# Patient Record
Sex: FEMALE | Race: WHITE | NOT HISPANIC OR LATINO | Employment: FULL TIME | URBAN - METROPOLITAN AREA
[De-identification: names, ages, dates, MRNs, and addresses within clinical notes are randomized per-mention and may not be internally consistent; named-entity substitution may affect disease eponyms.]

---

## 2017-07-24 ENCOUNTER — RECORDS - HEALTHEAST (OUTPATIENT)
Dept: LAB | Facility: CLINIC | Age: 32
End: 2017-07-24

## 2017-07-24 LAB
CHOLEST SERPL-MCNC: 159 MG/DL
FASTING STATUS PATIENT QL REPORTED: YES
HDLC SERPL-MCNC: 45 MG/DL
LDLC SERPL CALC-MCNC: 100 MG/DL
TRIGL SERPL-MCNC: 71 MG/DL

## 2017-07-26 LAB — 17-HYDROXYPROGESTERONE, S: <40 NG/DL

## 2017-07-27 LAB
MISCELLANEOUS TEST DEPT. - HE HISTORICAL: NORMAL
PERFORMING LAB: NORMAL
SPECIMEN STATUS: NORMAL
TEST NAME: NORMAL

## 2017-08-29 ENCOUNTER — AMBULATORY - HEALTHEAST (OUTPATIENT)
Dept: ADMINISTRATIVE | Facility: REHABILITATION | Age: 32
End: 2017-08-29

## 2017-08-29 DIAGNOSIS — M54.42 LEFT-SIDED LOW BACK PAIN WITH LEFT-SIDED SCIATICA: ICD-10-CM

## 2017-08-30 ENCOUNTER — OFFICE VISIT - HEALTHEAST (OUTPATIENT)
Dept: PHYSICAL THERAPY | Facility: REHABILITATION | Age: 32
End: 2017-08-30

## 2017-08-30 DIAGNOSIS — M54.42 ACUTE LEFT-SIDED LOW BACK PAIN WITH LEFT-SIDED SCIATICA: ICD-10-CM

## 2017-09-13 ENCOUNTER — OFFICE VISIT - HEALTHEAST (OUTPATIENT)
Dept: PHYSICAL THERAPY | Facility: REHABILITATION | Age: 32
End: 2017-09-13

## 2017-09-13 DIAGNOSIS — M54.42 ACUTE LEFT-SIDED LOW BACK PAIN WITH LEFT-SIDED SCIATICA: ICD-10-CM

## 2017-09-21 ENCOUNTER — OFFICE VISIT - HEALTHEAST (OUTPATIENT)
Dept: PHYSICAL THERAPY | Facility: REHABILITATION | Age: 32
End: 2017-09-21

## 2017-09-21 DIAGNOSIS — M54.42 ACUTE LEFT-SIDED LOW BACK PAIN WITH LEFT-SIDED SCIATICA: ICD-10-CM

## 2017-10-02 ENCOUNTER — OFFICE VISIT - HEALTHEAST (OUTPATIENT)
Dept: PHYSICAL THERAPY | Facility: REHABILITATION | Age: 32
End: 2017-10-02

## 2017-10-02 DIAGNOSIS — M54.42 ACUTE LEFT-SIDED LOW BACK PAIN WITH LEFT-SIDED SCIATICA: ICD-10-CM

## 2017-10-06 ENCOUNTER — RECORDS - HEALTHEAST (OUTPATIENT)
Dept: ADMINISTRATIVE | Facility: OTHER | Age: 32
End: 2017-10-06

## 2017-10-06 ENCOUNTER — AMBULATORY - HEALTHEAST (OUTPATIENT)
Dept: PHYSICAL MEDICINE AND REHAB | Facility: CLINIC | Age: 32
End: 2017-10-06

## 2017-10-06 DIAGNOSIS — G89.29 CHRONIC LEFT-SIDED LOW BACK PAIN WITH LEFT-SIDED SCIATICA: ICD-10-CM

## 2017-10-06 DIAGNOSIS — M54.42 CHRONIC LEFT-SIDED LOW BACK PAIN WITH LEFT-SIDED SCIATICA: ICD-10-CM

## 2017-10-16 ENCOUNTER — HOSPITAL ENCOUNTER (OUTPATIENT)
Dept: MRI IMAGING | Facility: CLINIC | Age: 32
Discharge: HOME | End: 2017-10-16
Attending: FAMILY MEDICINE

## 2017-10-16 DIAGNOSIS — M79.18 LEFT BUTTOCK PAIN: ICD-10-CM

## 2017-10-18 ENCOUNTER — OFFICE VISIT - HEALTHEAST (OUTPATIENT)
Dept: PHYSICAL THERAPY | Facility: REHABILITATION | Age: 32
End: 2017-10-18

## 2017-10-18 DIAGNOSIS — M54.42 ACUTE LEFT-SIDED LOW BACK PAIN WITH LEFT-SIDED SCIATICA: ICD-10-CM

## 2017-10-19 ENCOUNTER — HOSPITAL ENCOUNTER (OUTPATIENT)
Dept: PHYSICAL MEDICINE AND REHAB | Facility: CLINIC | Age: 32
Discharge: HOME | End: 2017-10-19
Attending: FAMILY MEDICINE

## 2017-10-19 DIAGNOSIS — M54.16 LUMBAR RADICULITIS: ICD-10-CM

## 2017-10-19 DIAGNOSIS — M51.26 LUMBAR DISC HERNIATION: ICD-10-CM

## 2017-10-19 ASSESSMENT — MIFFLIN-ST. JEOR: SCORE: 1582.29

## 2017-10-23 ENCOUNTER — HOSPITAL ENCOUNTER (OUTPATIENT)
Dept: PHYSICAL MEDICINE AND REHAB | Facility: CLINIC | Age: 32
Discharge: HOME | End: 2017-10-23
Attending: PHYSICIAN ASSISTANT

## 2017-10-23 DIAGNOSIS — M51.26 LUMBAR DISC HERNIATION: ICD-10-CM

## 2017-10-23 DIAGNOSIS — M54.16 LUMBAR RADICULITIS: ICD-10-CM

## 2017-11-06 ENCOUNTER — HOSPITAL ENCOUNTER (OUTPATIENT)
Dept: PHYSICAL MEDICINE AND REHAB | Facility: CLINIC | Age: 32
Discharge: HOME | End: 2017-11-06
Attending: PHYSICIAN ASSISTANT

## 2017-11-06 ENCOUNTER — AMBULATORY - HEALTHEAST (OUTPATIENT)
Dept: NEUROSURGERY | Facility: CLINIC | Age: 32
End: 2017-11-06

## 2017-11-06 DIAGNOSIS — M51.26 LUMBAR DISC HERNIATION: ICD-10-CM

## 2017-11-06 DIAGNOSIS — M54.9 BACK PAIN: ICD-10-CM

## 2017-11-06 DIAGNOSIS — M54.16 LUMBAR RADICULITIS: ICD-10-CM

## 2017-11-29 ENCOUNTER — OFFICE VISIT - HEALTHEAST (OUTPATIENT)
Dept: NEUROSURGERY | Facility: CLINIC | Age: 32
End: 2017-11-29

## 2017-11-29 ENCOUNTER — HOSPITAL ENCOUNTER (OUTPATIENT)
Dept: RADIOLOGY | Facility: CLINIC | Age: 32
Discharge: HOME | End: 2017-11-29
Attending: NEUROLOGICAL SURGERY

## 2017-11-29 DIAGNOSIS — M54.9 BACK PAIN: ICD-10-CM

## 2017-11-29 DIAGNOSIS — M51.9 INTERVERTEBRAL DISK SYNDROME: ICD-10-CM

## 2017-11-29 DIAGNOSIS — M54.10 RADICULOPATHY OF LEG: ICD-10-CM

## 2017-11-29 ASSESSMENT — MIFFLIN-ST. JEOR: SCORE: 1566.42

## 2017-12-11 ENCOUNTER — COMMUNICATION - HEALTHEAST (OUTPATIENT)
Dept: NEUROSURGERY | Facility: CLINIC | Age: 32
End: 2017-12-11

## 2017-12-12 ENCOUNTER — RECORDS - HEALTHEAST (OUTPATIENT)
Dept: ADMINISTRATIVE | Facility: OTHER | Age: 32
End: 2017-12-12

## 2017-12-12 ENCOUNTER — COMMUNICATION - HEALTHEAST (OUTPATIENT)
Dept: NEUROSURGERY | Facility: CLINIC | Age: 32
End: 2017-12-12

## 2017-12-15 ENCOUNTER — RECORDS - HEALTHEAST (OUTPATIENT)
Dept: ADMINISTRATIVE | Facility: OTHER | Age: 32
End: 2017-12-15

## 2017-12-15 ENCOUNTER — OFFICE VISIT - HEALTHEAST (OUTPATIENT)
Dept: NEUROSURGERY | Facility: CLINIC | Age: 32
End: 2017-12-15

## 2017-12-15 DIAGNOSIS — Z01.818 PRE-OP EVALUATION: ICD-10-CM

## 2017-12-19 ENCOUNTER — SURGERY - HEALTHEAST (OUTPATIENT)
Dept: SURGERY | Facility: CLINIC | Age: 32
End: 2017-12-19

## 2017-12-19 ENCOUNTER — ANESTHESIA - HEALTHEAST (OUTPATIENT)
Dept: SURGERY | Facility: CLINIC | Age: 32
End: 2017-12-19

## 2017-12-19 ASSESSMENT — MIFFLIN-ST. JEOR: SCORE: 1550.43

## 2017-12-20 ENCOUNTER — COMMUNICATION - HEALTHEAST (OUTPATIENT)
Dept: NEUROSURGERY | Facility: CLINIC | Age: 32
End: 2017-12-20

## 2017-12-20 ENCOUNTER — AMBULATORY - HEALTHEAST (OUTPATIENT)
Dept: NEUROSURGERY | Facility: CLINIC | Age: 32
End: 2017-12-20

## 2017-12-26 ENCOUNTER — COMMUNICATION - HEALTHEAST (OUTPATIENT)
Dept: NEUROSURGERY | Facility: CLINIC | Age: 32
End: 2017-12-26

## 2018-01-04 ENCOUNTER — AMBULATORY - HEALTHEAST (OUTPATIENT)
Dept: NEUROSURGERY | Facility: CLINIC | Age: 33
End: 2018-01-04

## 2018-02-01 ENCOUNTER — OFFICE VISIT - HEALTHEAST (OUTPATIENT)
Dept: NEUROSURGERY | Facility: CLINIC | Age: 33
End: 2018-02-01

## 2018-02-01 DIAGNOSIS — M51.16 LUMBAR DISC HERNIATION WITH RADICULOPATHY: ICD-10-CM

## 2018-02-12 ENCOUNTER — OFFICE VISIT - HEALTHEAST (OUTPATIENT)
Dept: PHYSICAL THERAPY | Facility: CLINIC | Age: 33
End: 2018-02-12

## 2018-02-12 DIAGNOSIS — M54.16 LUMBAR RADICULOPATHY: ICD-10-CM

## 2018-02-12 DIAGNOSIS — M62.81 GENERALIZED MUSCLE WEAKNESS: ICD-10-CM

## 2018-02-15 ENCOUNTER — OFFICE VISIT - HEALTHEAST (OUTPATIENT)
Dept: PHYSICAL THERAPY | Facility: CLINIC | Age: 33
End: 2018-02-15

## 2018-02-15 DIAGNOSIS — M54.16 LUMBAR RADICULOPATHY: ICD-10-CM

## 2018-02-15 DIAGNOSIS — M62.81 GENERALIZED MUSCLE WEAKNESS: ICD-10-CM

## 2018-02-22 ENCOUNTER — OFFICE VISIT - HEALTHEAST (OUTPATIENT)
Dept: PHYSICAL THERAPY | Facility: CLINIC | Age: 33
End: 2018-02-22

## 2018-02-22 DIAGNOSIS — M62.81 GENERALIZED MUSCLE WEAKNESS: ICD-10-CM

## 2018-02-22 DIAGNOSIS — M54.16 LUMBAR RADICULOPATHY: ICD-10-CM

## 2018-02-27 ENCOUNTER — OFFICE VISIT - HEALTHEAST (OUTPATIENT)
Dept: PHYSICAL THERAPY | Facility: CLINIC | Age: 33
End: 2018-02-27

## 2018-02-27 DIAGNOSIS — M54.42 ACUTE LEFT-SIDED LOW BACK PAIN WITH LEFT-SIDED SCIATICA: ICD-10-CM

## 2018-02-27 DIAGNOSIS — M62.81 GENERALIZED MUSCLE WEAKNESS: ICD-10-CM

## 2018-02-27 DIAGNOSIS — M54.16 LUMBAR RADICULOPATHY: ICD-10-CM

## 2018-03-01 ENCOUNTER — OFFICE VISIT - HEALTHEAST (OUTPATIENT)
Dept: PHYSICAL THERAPY | Facility: CLINIC | Age: 33
End: 2018-03-01

## 2018-03-01 DIAGNOSIS — M62.81 GENERALIZED MUSCLE WEAKNESS: ICD-10-CM

## 2018-03-01 DIAGNOSIS — M54.16 LUMBAR RADICULOPATHY: ICD-10-CM

## 2018-03-01 DIAGNOSIS — M54.42 ACUTE LEFT-SIDED LOW BACK PAIN WITH LEFT-SIDED SCIATICA: ICD-10-CM

## 2018-03-06 ENCOUNTER — OFFICE VISIT - HEALTHEAST (OUTPATIENT)
Dept: PHYSICAL THERAPY | Facility: CLINIC | Age: 33
End: 2018-03-06

## 2018-03-06 DIAGNOSIS — M54.42 ACUTE LEFT-SIDED LOW BACK PAIN WITH LEFT-SIDED SCIATICA: ICD-10-CM

## 2018-03-06 DIAGNOSIS — M54.16 LUMBAR RADICULOPATHY: ICD-10-CM

## 2018-03-06 DIAGNOSIS — M62.81 GENERALIZED MUSCLE WEAKNESS: ICD-10-CM

## 2018-03-08 ENCOUNTER — OFFICE VISIT - HEALTHEAST (OUTPATIENT)
Dept: PHYSICAL THERAPY | Facility: CLINIC | Age: 33
End: 2018-03-08

## 2018-03-08 DIAGNOSIS — M54.16 LUMBAR RADICULOPATHY: ICD-10-CM

## 2018-03-08 DIAGNOSIS — M62.81 GENERALIZED MUSCLE WEAKNESS: ICD-10-CM

## 2018-03-08 DIAGNOSIS — M54.42 ACUTE LEFT-SIDED LOW BACK PAIN WITH LEFT-SIDED SCIATICA: ICD-10-CM

## 2018-03-12 ENCOUNTER — OFFICE VISIT - HEALTHEAST (OUTPATIENT)
Dept: PHYSICAL THERAPY | Facility: CLINIC | Age: 33
End: 2018-03-12

## 2018-03-12 DIAGNOSIS — M62.81 GENERALIZED MUSCLE WEAKNESS: ICD-10-CM

## 2018-03-12 DIAGNOSIS — M54.42 ACUTE LEFT-SIDED LOW BACK PAIN WITH LEFT-SIDED SCIATICA: ICD-10-CM

## 2018-03-12 DIAGNOSIS — M54.16 LUMBAR RADICULOPATHY: ICD-10-CM

## 2018-03-15 ENCOUNTER — OFFICE VISIT - HEALTHEAST (OUTPATIENT)
Dept: PHYSICAL THERAPY | Facility: CLINIC | Age: 33
End: 2018-03-15

## 2018-03-15 DIAGNOSIS — M62.81 GENERALIZED MUSCLE WEAKNESS: ICD-10-CM

## 2018-03-15 DIAGNOSIS — M54.16 LUMBAR RADICULOPATHY: ICD-10-CM

## 2018-03-22 ENCOUNTER — OFFICE VISIT - HEALTHEAST (OUTPATIENT)
Dept: PHYSICAL THERAPY | Facility: CLINIC | Age: 33
End: 2018-03-22

## 2018-03-22 DIAGNOSIS — M62.81 GENERALIZED MUSCLE WEAKNESS: ICD-10-CM

## 2018-03-22 DIAGNOSIS — M54.16 LUMBAR RADICULOPATHY: ICD-10-CM

## 2018-03-29 ENCOUNTER — OFFICE VISIT - HEALTHEAST (OUTPATIENT)
Dept: PHYSICAL THERAPY | Facility: CLINIC | Age: 33
End: 2018-03-29

## 2018-03-29 DIAGNOSIS — M54.16 LUMBAR RADICULOPATHY: ICD-10-CM

## 2018-03-29 DIAGNOSIS — M62.81 GENERALIZED MUSCLE WEAKNESS: ICD-10-CM

## 2018-04-05 ENCOUNTER — OFFICE VISIT - HEALTHEAST (OUTPATIENT)
Dept: PHYSICAL THERAPY | Facility: CLINIC | Age: 33
End: 2018-04-05

## 2018-04-05 DIAGNOSIS — M62.81 GENERALIZED MUSCLE WEAKNESS: ICD-10-CM

## 2018-04-05 DIAGNOSIS — M54.16 LUMBAR RADICULOPATHY: ICD-10-CM

## 2018-07-01 ENCOUNTER — TRANSFERRED RECORDS (OUTPATIENT)
Dept: HEALTH INFORMATION MANAGEMENT | Facility: CLINIC | Age: 33
End: 2018-07-01

## 2018-07-01 LAB — PAP SMEAR - HIM PATIENT REPORTED: NEGATIVE

## 2018-07-11 ENCOUNTER — TELEPHONE (OUTPATIENT)
Dept: OTHER | Facility: CLINIC | Age: 33
End: 2018-07-11

## 2018-07-11 NOTE — TELEPHONE ENCOUNTER
7/11/2018    Call Regarding Onboarding [facility]    Attempt 1    Message on voicemail     Comments: 1 adult, 1 child      Outreach   CC

## 2018-07-17 NOTE — TELEPHONE ENCOUNTER
07/17/18      Call Regarding Onboarding [facility]    Attempt 2    Message on voicemail    Comments:       Outreach   Marisol Waite

## 2018-08-02 NOTE — TELEPHONE ENCOUNTER
8/2/2018    Call Regarding Onboarding: [facility]    Attempt 3    Message on voicemail     Comments: spouse, 1 child dep      Outreach   SV

## 2018-10-29 ENCOUNTER — OFFICE VISIT (OUTPATIENT)
Dept: PEDIATRICS | Facility: CLINIC | Age: 33
End: 2018-10-29
Payer: COMMERCIAL

## 2018-10-29 VITALS
OXYGEN SATURATION: 99 % | SYSTOLIC BLOOD PRESSURE: 102 MMHG | WEIGHT: 182 LBS | TEMPERATURE: 98.4 F | HEART RATE: 77 BPM | HEIGHT: 70 IN | DIASTOLIC BLOOD PRESSURE: 68 MMHG | BODY MASS INDEX: 26.05 KG/M2

## 2018-10-29 DIAGNOSIS — J45.40 MODERATE PERSISTENT ASTHMA WITHOUT COMPLICATION: Primary | ICD-10-CM

## 2018-10-29 DIAGNOSIS — F41.1 GAD (GENERALIZED ANXIETY DISORDER): ICD-10-CM

## 2018-10-29 DIAGNOSIS — F32.0 CURRENT MILD EPISODE OF MAJOR DEPRESSIVE DISORDER WITHOUT PRIOR EPISODE (H): ICD-10-CM

## 2018-10-29 PROCEDURE — 99203 OFFICE O/P NEW LOW 30 MIN: CPT | Performed by: PEDIATRICS

## 2018-10-29 RX ORDER — FEXOFENADINE HCL 180 MG/1
180 TABLET ORAL DAILY
COMMUNITY

## 2018-10-29 RX ORDER — MONTELUKAST SODIUM 10 MG/1
1 TABLET ORAL DAILY
Qty: 90 TABLET | Refills: 3 | Status: SHIPPED | OUTPATIENT
Start: 2018-10-29

## 2018-10-29 RX ORDER — BUPROPION HYDROCHLORIDE 300 MG/1
300 TABLET ORAL DAILY
Refills: 3 | COMMUNITY
Start: 2018-08-29

## 2018-10-29 RX ORDER — DROSPIRENONE AND ETHINYL ESTRADIOL 0.02-3(28)
1 KIT ORAL DAILY
Refills: 4 | COMMUNITY
Start: 2018-09-13

## 2018-10-29 RX ORDER — BUPROPION HYDROCHLORIDE 150 MG/1
TABLET ORAL
Qty: 30 TABLET | Refills: 1 | Status: SHIPPED | OUTPATIENT
Start: 2018-10-29

## 2018-10-29 RX ORDER — ALBUTEROL SULFATE 90 UG/1
2 AEROSOL, METERED RESPIRATORY (INHALATION) EVERY 6 HOURS
Qty: 3 INHALER | Refills: 3 | Status: SHIPPED | OUTPATIENT
Start: 2018-10-29

## 2018-10-29 RX ORDER — MONTELUKAST SODIUM 10 MG/1
1 TABLET ORAL DAILY
Refills: 3 | COMMUNITY
Start: 2017-11-20 | End: 2018-10-29

## 2018-10-29 RX ORDER — BUPROPION HYDROCHLORIDE 300 MG/1
300 TABLET ORAL DAILY
Qty: 30 TABLET | Refills: 3 | Status: CANCELLED | OUTPATIENT
Start: 2018-10-29

## 2018-10-29 RX ORDER — ALBUTEROL SULFATE 90 UG/1
2 AEROSOL, METERED RESPIRATORY (INHALATION) EVERY 6 HOURS
COMMUNITY
End: 2018-10-29
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
## 2018-10-29 NOTE — MR AVS SNAPSHOT
"              After Visit Summary   10/29/2018    Balta Garrido    MRN: [id number]           Patient Information     Date Of Birth          1985        Visit Information        Provider Department      10/29/2018 1:00 PM Deyanira Sinha MD [facility]        Today's Diagnoses     Moderate persistent asthma without complication    -  1    Current mild episode of major depressive disorder without prior episode (H)        SHARLA (generalized anxiety disorder)          Care Instructions    Restart montelukast, advair and proair as needed.    Start to taper Wellbutrin - if not going well, please make follow up appt with me.           Follow-ups after your visit        Who to contact     If you have questions or need follow up information about today's clinic visit or your schedule please contact [facility] directly at [phone].  Normal or non-critical lab and imaging results will be communicated to you by MyChart, letter or phone within 4 business days after the clinic has received the results. If you do not hear from us within 7 days, please contact the clinic through MyChart or phone. If you have a critical or abnormal lab result, we will notify you by phone as soon as possible.  Submit refill requests through [employer] or call your pharmacy and they will forward the refill request to us. Please allow 3 business days for your refill to be completed.          Additional Information About Your Visit        MyChart Information     [employer] lets you send messages to your doctor, view your test results, renew your prescriptions, schedule appointments and more. To sign up, go to [email or web address] . Click on \"Log in\" on the left side of the screen, which will take you to the Welcome page. Then click on \"Sign up Now\" on the right side of the page.     You will be asked to enter the access code listed below, as well as some personal information. Please follow the directions to create your " "username and password.     Your access code is: [email or web address]  Expires: 2019  2:06 PM     Your access code will  in 90 days. If you need help or a new code, please call your [facility] or [phone].        Care EveryWhere ID     This is your Care EveryWhere ID. This could be used by other organizations to access your [facility] medical records  [id number]        Your Vitals Were     Pulse Temperature Height Last Period Pulse Oximetry BMI (Body Mass Index)    77 98.4  F (36.9  C) (Oral) 5' 9.5\" (1.765 m) 10/13/2018 99% 26.49 kg/m2       Blood Pressure from Last 3 Encounters:   10/29/18 102/68    Weight from Last 3 Encounters:   10/29/18 182 lb (82.6 kg)              Today, you had the following     No orders found for display         Today's Medication Changes          These changes are accurate as of 10/29/18  2:06 PM.  If you have any questions, ask your nurse or doctor.               These medicines have changed or have updated prescriptions.        Dose/Directions    * buPROPion 300 MG 24 hr tablet   Commonly known as:  WELLBUTRIN XL   This may have changed:  Another medication with the same name was added. Make sure you understand how and when to take each.   Changed by:  Deyanira Sinha MD        Dose:  300 mg   Take 300 mg by mouth daily   Refills:  3       * buPROPion 150 MG 24 hr tablet   Commonly known as:  WELLBUTRIN XL   This may have changed:  You were already taking a medication with the same name, and this prescription was added. Make sure you understand how and when to take each.   Used for:  Current mild episode of major depressive disorder without prior episode (H), SHARLA (generalized anxiety disorder)   Changed by:  Deyanira Sinha MD        Take 150mg once daily for 2 weeks, then take 150mg every other day for 2 weeks.   Quantity:  30 tablet   Refills:  1       fluticasone-salmeterol 250-50 MCG/DOSE diskus inhaler   Commonly known as:  ADVAIR DISKUS   This may have " changed:  when to take this   Used for:  Moderate persistent asthma without complication   Changed by:  Deyanira Sinha MD        Dose:  1 puff   Inhale 1 puff into the lungs 2 times daily   Quantity:  3 Inhaler   Refills:  3       * Notice:  This list has 2 medication(s) that are the same as other medications prescribed for you. Read the directions carefully, and ask your doctor or other care provider to review them with you.         Where to get your medicines      [place] medications [place] [address] [place] [facility] [place] [facility] [place] [address] [facility]  [address] [facility] [address] [facility] [place] [address]     Phone:  [phone]     albuterol 108 (90 Base) MCG/ACT inhaler    buPROPion 150 MG 24 hr tablet    fluticasone-salmeterol 250-50 MCG/DOSE diskus inhaler    montelukast 10 MG tablet                Primary Care Provider Fax #    Physician No Ref-Primary [phone]       No address on file        Equal Access to Services     [facility] : Hadii stefano alvaradoo Sojocelin, waaxda luqadaha, qaybta kaalmada adeisayajohana, poornima escobar . So [facility] [phone].    ATENCIÓN: Si habla español, tiene a velez disposición servicios gratuitos de asistencia lingüística. Comfort al [phone].    We comply with applicable federal civil rights laws and [place] laws. We do not discriminate on the basis of race, color, national origin, age, disability, sex, sexual orientation, or gender identity.            Thank you!     Thank you for choosing [facility]  for your care. Our goal is always to provide you with excellent care. Hearing back from our patients is one way we can continue to improve our services. Please take a few minutes to complete the written survey that you may receive in the mail after your visit with us. Thank you!             Your Updated Medication List - Protect others around you: Learn how to safely use, store and throw away your medicines at www.disposemymeds.org.           This list is accurate as of 10/29/18  2:06 PM.  Always use your most recent med list.                   Brand Name Dispense Instructions for use Diagnosis    albuterol 108 (90 Base) MCG/ACT inhaler    PROAIR HFA/PROVENTIL HFA/VENTOLIN HFA    3 Inhaler    Inhale 2 puffs into the lungs every 6 hours    Moderate persistent asthma without complication       * buPROPion 300 MG 24 hr tablet    WELLBUTRIN XL     Take 300 mg by mouth daily        * buPROPion 150 MG 24 hr tablet    WELLBUTRIN XL    30 tablet    Take 150mg once daily for 2 weeks, then take 150mg every other day for 2 weeks.    Current mild episode of major depressive disorder without prior episode (H), SHARLA (generalized anxiety disorder)       CALCIUM + D3 600-800 MG-UNIT Tabs   Generic drug:  Calcium Carb-Cholecalciferol      Take 1 tablet by mouth daily        fexofenadine 180 MG tablet    ALLEGRA     Take 180 mg by mouth daily        fluticasone-salmeterol 250-50 MCG/DOSE diskus inhaler    ADVAIR DISKUS    3 Inhaler    Inhale 1 puff into the lungs 2 times daily    Moderate persistent asthma without complication       GIANVI 3-0.02 MG per tablet   Generic drug:  drospirenone-ethinyl estradiol      Take 1 tablet by mouth daily        montelukast 10 MG tablet    SINGULAIR    90 tablet    Take 1 tablet (10 mg) by mouth daily    Moderate persistent asthma without complication       * Notice:  This list has 2 medication(s) that are the same as other medications prescribed for you. Read the directions carefully, and ask your doctor or other care provider to review them with you.

## 2018-10-29 NOTE — PATIENT INSTRUCTIONS
Restart montelukast, advair and proair as needed.    Start to taper Wellbutrin - if not going well, please make follow up appt with me.

## 2018-10-31 ASSESSMENT — PATIENT HEALTH QUESTIONNAIRE - PHQ9
SUM OF ALL RESPONSES TO PHQ QUESTIONS 1-9: 1
5. POOR APPETITE OR OVEREATING: SEVERAL DAYS

## 2018-10-31 ASSESSMENT — ANXIETY QUESTIONNAIRES
3. WORRYING TOO MUCH ABOUT DIFFERENT THINGS: SEVERAL DAYS
2. NOT BEING ABLE TO STOP OR CONTROL WORRYING: SEVERAL DAYS
6. BECOMING EASILY ANNOYED OR IRRITABLE: MORE THAN HALF THE DAYS
7. FEELING AFRAID AS IF SOMETHING AWFUL MIGHT HAPPEN: SEVERAL DAYS
IF YOU CHECKED OFF ANY PROBLEMS ON THIS QUESTIONNAIRE, HOW DIFFICULT HAVE THESE PROBLEMS MADE IT FOR YOU TO DO YOUR WORK, TAKE CARE OF THINGS AT HOME, OR GET ALONG WITH OTHER PEOPLE: SOMEWHAT DIFFICULT
1. FEELING NERVOUS, ANXIOUS, OR ON EDGE: SEVERAL DAYS
GAD7 TOTAL SCORE: 8
5. BEING SO RESTLESS THAT IT IS HARD TO SIT STILL: SEVERAL DAYS

## 2018-11-01 ASSESSMENT — ANXIETY QUESTIONNAIRES: GAD7 TOTAL SCORE: 8

## 2018-11-01 ASSESSMENT — ASTHMA QUESTIONNAIRES: ACT_TOTALSCORE: 18

## 2019-01-14 ENCOUNTER — TRANSFERRED RECORDS (OUTPATIENT)
Dept: HEALTH INFORMATION MANAGEMENT | Facility: CLINIC | Age: 34
End: 2019-01-14

## 2019-04-14 ENCOUNTER — APPOINTMENT (OUTPATIENT)
Dept: ULTRASOUND IMAGING | Facility: CLINIC | Age: 34
End: 2019-04-14
Attending: EMERGENCY MEDICINE
Payer: COMMERCIAL

## 2019-04-14 ENCOUNTER — HOSPITAL ENCOUNTER (EMERGENCY)
Facility: CLINIC | Age: 34
Discharge: HOME | End: 2019-04-14
Attending: EMERGENCY MEDICINE | Admitting: EMERGENCY MEDICINE
Payer: COMMERCIAL

## 2019-04-14 VITALS
SYSTOLIC BLOOD PRESSURE: 137 MMHG | RESPIRATION RATE: 20 BRPM | HEIGHT: 70 IN | WEIGHT: 180 LBS | HEART RATE: 78 BPM | TEMPERATURE: 97.7 F | BODY MASS INDEX: 25.77 KG/M2 | OXYGEN SATURATION: 98 % | DIASTOLIC BLOOD PRESSURE: 82 MMHG

## 2019-04-14 DIAGNOSIS — O03.9 SPONTANEOUS ABORTION: Primary | ICD-10-CM

## 2019-04-14 DIAGNOSIS — O46.90 VAGINAL BLEEDING IN PREGNANCY: ICD-10-CM

## 2019-04-14 LAB
ABO + RH BLD: NORMAL
ABO + RH BLD: NORMAL
ANION GAP SERPL CALCULATED.3IONS-SCNC: 7 MMOL/L (ref 3–14)
B-HCG SERPL-ACNC: 1901 IU/L (ref 0–5)
BLOOD BANK CMNT PATIENT-IMP: NORMAL
BLOOD BANK CMNT PATIENT-IMP: NORMAL
BUN SERPL-MCNC: 10 MG/DL (ref 7–30)
CALCIUM SERPL-MCNC: 8.9 MG/DL (ref 8.5–10.1)
CHLORIDE SERPL-SCNC: 108 MMOL/L (ref 94–109)
CO2 SERPL-SCNC: 25 MMOL/L (ref 20–32)
CREAT SERPL-MCNC: 0.57 MG/DL (ref 0.52–1.04)
FETAL CELL SCN BLD QL ROSETTE: NORMAL
GFR SERPL CREATININE-BSD FRML MDRD: >90 ML/MIN/{1.73_M2}
GLUCOSE SERPL-MCNC: 114 MG/DL (ref 70–99)
HGB BLD-MCNC: 14.5 G/DL (ref 11.7–15.7)
POTASSIUM SERPL-SCNC: 3.6 MMOL/L (ref 3.4–5.3)
RH IG VIALS RECOM PATIENT: NORMAL
SODIUM SERPL-SCNC: 140 MMOL/L (ref 133–144)

## 2019-04-14 PROCEDURE — 84702 CHORIONIC GONADOTROPIN TEST: CPT | Performed by: EMERGENCY MEDICINE

## 2019-04-14 PROCEDURE — 96375 TX/PRO/DX INJ NEW DRUG ADDON: CPT

## 2019-04-14 PROCEDURE — 86900 BLOOD TYPING SEROLOGIC ABO: CPT | Performed by: EMERGENCY MEDICINE

## 2019-04-14 PROCEDURE — 80048 BASIC METABOLIC PNL TOTAL CA: CPT | Performed by: EMERGENCY MEDICINE

## 2019-04-14 PROCEDURE — 85461 HEMOGLOBIN FETAL: CPT | Performed by: EMERGENCY MEDICINE

## 2019-04-14 PROCEDURE — 96376 TX/PRO/DX INJ SAME DRUG ADON: CPT

## 2019-04-14 PROCEDURE — 86901 BLOOD TYPING SEROLOGIC RH(D): CPT | Performed by: EMERGENCY MEDICINE

## 2019-04-14 PROCEDURE — 85018 HEMOGLOBIN: CPT | Performed by: EMERGENCY MEDICINE

## 2019-04-14 PROCEDURE — 96374 THER/PROPH/DIAG INJ IV PUSH: CPT

## 2019-04-14 PROCEDURE — 76801 OB US < 14 WKS SINGLE FETUS: CPT

## 2019-04-14 PROCEDURE — 25000128 H RX IP 250 OP 636: Performed by: EMERGENCY MEDICINE

## 2019-04-14 PROCEDURE — 99285 EMERGENCY DEPT VISIT HI MDM: CPT | Mod: 25

## 2019-04-14 RX ORDER — HYDROMORPHONE HYDROCHLORIDE 1 MG/ML
0.5 INJECTION, SOLUTION INTRAMUSCULAR; INTRAVENOUS; SUBCUTANEOUS ONCE
Status: COMPLETED | OUTPATIENT
Start: 2019-04-14 | End: 2019-04-14

## 2019-04-14 RX ORDER — HYDROCODONE BITARTRATE AND ACETAMINOPHEN 5; 325 MG/1; MG/1
1-2 TABLET ORAL EVERY 6 HOURS PRN
Qty: 10 TABLET | Refills: 0 | Status: SHIPPED | OUTPATIENT
Start: 2019-04-14

## 2019-04-14 RX ORDER — FENTANYL CITRATE 50 UG/ML
100 INJECTION, SOLUTION INTRAMUSCULAR; INTRAVENOUS ONCE
Status: COMPLETED | OUTPATIENT
Start: 2019-04-14 | End: 2019-04-14

## 2019-04-14 RX ORDER — KETOROLAC TROMETHAMINE 15 MG/ML
15 INJECTION, SOLUTION INTRAMUSCULAR; INTRAVENOUS ONCE
Status: COMPLETED | OUTPATIENT
Start: 2019-04-14 | End: 2019-04-14

## 2019-04-14 RX ORDER — ACETAMINOPHEN 500 MG
1000 TABLET ORAL ONCE
Status: DISCONTINUED | OUTPATIENT
Start: 2019-04-14 | End: 2019-04-14 | Stop reason: HOSPADM

## 2019-04-14 RX ADMIN — KETOROLAC TROMETHAMINE 15 MG: 15 INJECTION, SOLUTION INTRAMUSCULAR; INTRAVENOUS at 15:51

## 2019-04-14 RX ADMIN — FENTANYL CITRATE 50 MCG: 50 INJECTION, SOLUTION INTRAMUSCULAR; INTRAVENOUS at 14:13

## 2019-04-14 RX ADMIN — Medication 0.5 MG: at 16:38

## 2019-04-14 RX ADMIN — FENTANYL CITRATE 50 MCG: 50 INJECTION, SOLUTION INTRAMUSCULAR; INTRAVENOUS at 14:19

## 2019-04-14 RX ADMIN — Medication 0.5 MG: at 15:26

## 2019-04-14 ASSESSMENT — ENCOUNTER SYMPTOMS
VOMITING: 0
FEVER: 0
CHILLS: 0
SHORTNESS OF BREATH: 0
ABDOMINAL PAIN: 1
NAUSEA: 0

## 2019-04-14 ASSESSMENT — MIFFLIN-ST. JEOR: SCORE: 1583.78

## 2019-04-14 NOTE — ED NOTES
"[facility]  ED Nurse Handoff Report    ED Chief complaint: Abdominal Pain (Patient complains of abdominal pain and cramping. Patient reports she is 12 weeks pregnant. Patient reports brown spotting yesterday and cramping that started this morning. States cramping is getting worse as the day goes on.)      ED Diagnosis:   Final diagnoses:   Vaginal bleeding in pregnancy   Spontaneous        Code Status: Full Code    Allergies:   Allergies   Allergen Reactions     Percocet [Oxycodone-Acetaminophen] Itching       Activity level - Baseline/Home:  Independent    Activity Level - Current:   Independent- SBA     Needed?: No    Isolation: No  Infection: Not Applicable  Bariatric?: No    Vital Signs:   Vitals:    19 1600 19 1615 19 1630 19 1645   BP:       Pulse:       Resp:       Temp:       TempSrc:       SpO2: 100% 98% 100% 97%   Weight:       Height:           Cardiac Rhythm: ,        Pain level: 0-10 Pain Scale: 7    Is this patient confused?: No   Does this patient have a guardian?  No         If yes, is there guardianship documents in the Epic \"Code/ACP\" activity?  N/A         Guardian Notified?  N/A  [place] - Suicide Severity Rating Scale Completed?  Yes  If yes, what color did the patient score?  White    Patient Report: Initial Complaint: abd cramping. 12 weeks pregnant  Focused Assessment: pt having miscarriage. Upon arrival pt had only been spotting brown. Now pt having bright red blood. Cramping very painful even with pain management.   Tests Performed: labs, US  Abnormal Results: miscarriage   Treatments provided: pain management    Family Comments: at bedside, supportive    OBS brochure/video discussed/provided to patient/family: Yes              Name of person given brochure if not patient:               Relationship to patient:     ED Medications:   Medications   Blood Bank will determine if patient is eligible for and the proper dosage of Rho (D) " immune globulin (RhoGam) (has no administration in time range)   acetaminophen (TYLENOL) tablet 1,000 mg (has no administration in time range)   NO Rho (D)  immune globulin (RhoGam) needed  - mother INELIGIBLE (has no administration in time range)   fentaNYL (PF) (SUBLIMAZE) injection 100 mcg (50 mcg Intravenous Given 4/14/19 1419)   HYDROmorphone (PF) (DILAUDID) injection 0.5 mg (0.5 mg Intravenous Given 4/14/19 1526)   ketorolac (TORADOL) injection 15 mg (15 mg Intravenous Given 4/14/19 1551)   HYDROmorphone (PF) (DILAUDID) injection 0.5 mg (0.5 mg Intravenous Given 4/14/19 1638)       Drips infusing?:  No    For the majority of the shift this patient was Green.   Interventions performed were n/a.    Severe Sepsis OR Septic Shock Diagnosis Present: No    To be done/followed up on inpatient unit:      ED NURSE PHONE NUMBER: [phone]

## 2019-04-14 NOTE — ED AVS SNAPSHOT
Emergency Department  [address] [place] [address]  Phone:  [phone]  Fax:  [phone]                                    Balta Garrido   MRN: [id number]    Department:   Emergency Department   Date of Visit:  4/14/2019           After Visit Summary Signature Page    I have received my discharge instructions, and my questions have been answered. I have discussed any challenges I see with this plan with the nurse or doctor.    ..........................................................................................................................................  Patient/Patient Representative Signature      ..........................................................................................................................................  Patient Representative Print Name and Relationship to Patient    ..................................................               ................................................  Date                                   Time    ..........................................................................................................................................  Reviewed by Signature/Title    ...................................................              ..............................................  Date                                               Time          22EPIC Rev 08/18

## 2019-04-14 NOTE — PROGRESS NOTES
RECEIVING UNIT ED HANDOFF REVIEW    ED Nurse Handoff Report was reviewed by: Patito Goode on April 14, 2019 at 5:37 PM

## 2019-04-14 NOTE — ED PROVIDER NOTES
History     Chief Complaint:  Abdominal Pain    HPI   Balta Garrido is a A1 33 year old female at approximately 12 weeks gestation who presents with abdominal pain. The patient reports her pregnancy has been going well so far. She follows with [facility] in [place], [place] and had her first ultrasound on 3/15/19, which showed an intrauterine pregnancy at about 7 weeks 2 days. Yesterday, the patient notes she started experiencing some thick, brown vaginal discharge, but was otherwise asymptomatic. However, around 1030 today, the patient [place] when she started experiencing lower abdominal cramping, which came in waves. Since then, she notes the pain has been progressively worsening, causing her to double over at times, so she had her father bring her to the ED for further evaluation. Here, she denies any dave vaginal bleeding, chest pain, shortness of breath, fevers, chills, or other acute symptoms. The patient notes she did have an upper respiratory infection a few weeks ago, but otherwise has been doing well.    Allergies:  Percocet    Medications:    Albuterol inhaler  Bupropion  Allegra  Advair diskus inhaler  Singulair    Past Medical History:    Anxiety  Depression  Moderate persistent asthma    Past Surgical History:    Tonsillectomy  Lumbar laminectomy, left L5-S1   section    Family History:    Diabetes  Alzheimer's disease  Hypothyroidism  Stroke    Social History:  Smoking status: No  Alcohol use: Yes, socially  Drug use: No  PCP: Deyanira Sinha  Presents to the ED with her father  Marital Status:  [2]     Review of Systems   Constitutional: Negative for chills and fever.   Respiratory: Negative for shortness of breath.    Cardiovascular: Negative for chest pain.   Gastrointestinal: Positive for abdominal pain. Negative for nausea and vomiting.   Genitourinary: Positive for vaginal discharge. Negative for vaginal bleeding.   All other systems  "reviewed and are negative.    Physical Exam     Patient Vitals for the past 24 hrs:   BP Temp Temp src Pulse Resp SpO2 Height Weight   19 1545 -- -- -- -- -- 97 % -- --   19 1530 (!) 127/93 -- -- 64 -- -- -- --   19 1430 -- -- -- -- -- 100 % -- --   19 1415 -- -- -- -- -- 100 % -- --   19 1400 -- -- -- -- -- 100 % -- --   19 1337 (!) 167/101 -- -- -- -- -- -- --   19 1335 -- 97.7  F (36.5  C) Oral 90 16 99 % 1.765 m (5' 9.5\") 81.6 kg (180 lb)     Physical Exam  General: Alert and cooperative with exam. Patient in moderate distress. Normal mentation.  Head:  Scalp is NC/AT  Eyes:  No scleral icterus, PERRL  ENT:  The external nose and ears are normal. The oropharynx is normal and without erythema; mucus membranes are moist. Uvula midline, no evidence of deep space infection.  Neck:  Normal range of motion without rigidity.  CV:  Regular rate and rhythm    No pathologic murmur   Resp:  Breath sounds are clear bilaterally    Non-labored, no retractions or accessory muscle use  GI:  Abdomen is soft, no distension, no tenderness. No peritoneal signs  :  Normal external female genitalia. Cervical ox visualized and closed with few dark red blood clots in the vaginal canal. No evidence of significant ongoing bleeding.  MS:  No lower extremity edema   Skin:  Warm and dry, No rash or lesions noted.  Neuro: Oriented x 3. No gross motor deficits.    Emergency Department Course   Imaging:  Radiographic findings were communicated with the patient who voiced understanding of the findings.    US OB < 14 Weeks w Transvaginal:  Irregular gestational sac in the region of the cervix.  Embryo is seen but no cardiac activity is identified. These findings  are consistent with fetal demise and an  in progress.  As read by Radiology.    Laboratory:  Hemoglobin: 14.5  BMP: Glucose 114 (H), o/w WNL (Creatinine 0.57)  ABO/Rh type: O positive  HCG quant:  (H)    Interventions:  1419: 50 " mcg Fentanyl IV  1526: 0.5 mg Dilaudid IV  1551: 15 mg Toradol IV  1638: 0.5 mg Dilaudid IV    Emergency Department Course:  Past medical records, nursing notes, and vitals reviewed.  1351: I performed an exam of the patient and obtained history, as documented above.  IV inserted and blood drawn.    1406: I performed a brief bedside ultrasound. She will be sent for a formal ultrasound, findings above.    1420: I performed a pelvic exam, findings above.    1451: I spoke to Dr. Benoit on-call for radiology regarding the patient's ultrasound results.    1520: I rechecked the patient. She is still having persistent pain.  1630: I rechecked the patient again. Her pain is not improving. She consents to admission at this time.    1645: Discussed the patient with Dr. Car of Ob/Gyn, who will admit the patient to an observation bed for further monitoring, evaluation, and treatment.     1730: I rechecked the patient as she was reportedly feeling better. She has follow-up scheduled for tomorrow and I think it is reasonable to discharge her with this in place.    1804: I spoke to Dr. Car on-call for Ob/Gyn to update her on the plan for discharge. Patient discharged home with instructions regarding supportive care, medications, and reasons to return. The importance of close follow-up was reviewed.     Impression & Plan    Medical Decision Making:  Balta Garrido is a 33 year old female who presents for evaluation of vaginal bleeding and abdominal cramping. The workup here shows no fetal heart rate and likely spontaneous . I considered a broad differential including ovarian cyst, UTI, pyelonephritis, subchorionic hemorrhage, uterine bleeding, active miscarriage, constipation, etc. More rare, but serious etiologies were also considered including ectopic pregnancy, appendicitis, cholecystitis, volvulus, intraabdominal abscess, heterotopic pregnancy, etc. In this patient, there are no signs of these other serious  etiologies of abdominal pain. I had planned to admit the patient for pain control and spoke to Dr. Car of Ob/Gyn who had accepted her for observation. Later during her ED stay, I re-evaluated the patient. She noted resolution of her pain and symptoms. I will discharge her instead and have her follow-up closely as scheduled with her Ob/Gyn provider tomorrow. Return precautions were discussed.         Diagnosis:    ICD-10-CM    1. Spontaneous  O03.9    2. Vaginal bleeding in pregnancy O46.90      Disposition: Discharged to home    Discharge Medications:    Dose / Directions   HYDROcodone-acetaminophen 5-325 MG tablet  Commonly known as:  NORCO      Dose:  1-2 tablet  Take 1-2 tablets by mouth every 6 hours as needed for breakthrough pain or severe pain  Quantity:  10 tablet  Refills:  0       Shira Yeung  2019    EMERGENCY DEPARTMENT    I, Shira Yeung, am serving as a scribe at 1:51 PM on 2019 to document services personally performed by Yan Sharma DO based on my observations and the provider's statements to me.      Yan Sharma DO  19

## 2019-04-15 ENCOUNTER — ANESTHESIA - HEALTHEAST (OUTPATIENT)
Dept: SURGERY | Facility: AMBULATORY SURGERY CENTER | Age: 34
End: 2019-04-15

## 2019-04-15 ENCOUNTER — SURGERY - HEALTHEAST (OUTPATIENT)
Dept: SURGERY | Facility: AMBULATORY SURGERY CENTER | Age: 34
End: 2019-04-15

## 2019-04-15 ASSESSMENT — MIFFLIN-ST. JEOR
SCORE: 1611.21
SCORE: 1611.21

## 2019-04-29 ENCOUNTER — TRANSFERRED RECORDS (OUTPATIENT)
Dept: HEALTH INFORMATION MANAGEMENT | Facility: CLINIC | Age: 34
End: 2019-04-29

## 2019-07-12 NOTE — DISCHARGE INSTRUCTIONS
Return to the emergency department or seek medical care as instructed if your symptoms fail to improve or significantly worsen.    Take Acetaminophen (aka Tylenol) or Norco (prescription pain medicine) and/or ibuprofen (aka Motrin/Advil) as needed for symptom/pain relief; use as directed.    Follow-up as indicated on page 1.  Maintain adequate hydration and get plenty of rest.     Myalgia Treatment: I explained this is common when taking isotretinoin. If this worsens they will contact us. They may try OTC ibuprofen.

## 2020-03-11 ENCOUNTER — HEALTH MAINTENANCE LETTER (OUTPATIENT)
Age: 35
End: 2020-03-11

## 2020-04-03 ENCOUNTER — SURGERY - HEALTHEAST (OUTPATIENT)
Dept: OBGYN | Facility: CLINIC | Age: 35
End: 2020-04-03

## 2020-04-03 ENCOUNTER — ANESTHESIA - HEALTHEAST (OUTPATIENT)
Dept: OBGYN | Facility: CLINIC | Age: 35
End: 2020-04-03

## 2020-04-03 ENCOUNTER — TRANSFERRED RECORDS (OUTPATIENT)
Dept: HEALTH INFORMATION MANAGEMENT | Facility: CLINIC | Age: 35
End: 2020-04-03

## 2020-04-03 ASSESSMENT — MIFFLIN-ST. JEOR: SCORE: 1759.19

## 2020-04-06 ENCOUNTER — COMMUNICATION - HEALTHEAST (OUTPATIENT)
Dept: OBGYN | Facility: CLINIC | Age: 35
End: 2020-04-06

## 2020-04-06 ENCOUNTER — COMMUNICATION - HEALTHEAST (OUTPATIENT)
Dept: HEALTH INFORMATION MANAGEMENT | Facility: CLINIC | Age: 35
End: 2020-04-06

## 2020-04-17 ENCOUNTER — AMBULATORY - HEALTHEAST (OUTPATIENT)
Dept: PEDIATRICS | Facility: CLINIC | Age: 35
End: 2020-04-17

## 2020-11-25 ENCOUNTER — TRANSFERRED RECORDS (OUTPATIENT)
Dept: HEALTH INFORMATION MANAGEMENT | Facility: CLINIC | Age: 35
End: 2020-11-25

## 2021-01-03 ENCOUNTER — HEALTH MAINTENANCE LETTER (OUTPATIENT)
Age: 36
End: 2021-01-03

## 2021-04-25 ENCOUNTER — HEALTH MAINTENANCE LETTER (OUTPATIENT)
Age: 36
End: 2021-04-25

## 2021-05-07 ENCOUNTER — ANCILLARY PROCEDURE (OUTPATIENT)
Dept: GENERAL RADIOLOGY | Facility: CLINIC | Age: 36
End: 2021-05-07
Attending: FAMILY MEDICINE
Payer: COMMERCIAL

## 2021-05-07 ENCOUNTER — OFFICE VISIT (OUTPATIENT)
Dept: ORTHOPEDICS | Facility: CLINIC | Age: 36
End: 2021-05-07
Payer: COMMERCIAL

## 2021-05-07 VITALS — WEIGHT: 180 LBS | HEIGHT: 69 IN | BODY MASS INDEX: 26.66 KG/M2

## 2021-05-07 DIAGNOSIS — S92.354A NONDISPLACED FRACTURE OF FIFTH METATARSAL BONE, RIGHT FOOT, INITIAL ENCOUNTER FOR CLOSED FRACTURE: Primary | ICD-10-CM

## 2021-05-07 DIAGNOSIS — M79.671 RIGHT FOOT PAIN: ICD-10-CM

## 2021-05-07 DIAGNOSIS — M79.671 RIGHT FOOT PAIN: Primary | ICD-10-CM

## 2021-05-07 LAB — RADIOLOGIST FLAGS: NORMAL

## 2021-05-07 PROCEDURE — 73630 X-RAY EXAM OF FOOT: CPT | Mod: RT | Performed by: RADIOLOGY

## 2021-05-07 PROCEDURE — 99203 OFFICE O/P NEW LOW 30 MIN: CPT | Performed by: FAMILY MEDICINE

## 2021-05-07 ASSESSMENT — MIFFLIN-ST. JEOR: SCORE: 1565.85
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
## 2021-05-13 DIAGNOSIS — S92.354A NONDISPLACED FRACTURE OF FIFTH METATARSAL BONE, RIGHT FOOT, INITIAL ENCOUNTER FOR CLOSED FRACTURE: Primary | ICD-10-CM

## 2021-05-14 ENCOUNTER — ANCILLARY PROCEDURE (OUTPATIENT)
Dept: GENERAL RADIOLOGY | Facility: CLINIC | Age: 36
End: 2021-05-14
Payer: COMMERCIAL

## 2021-05-14 ENCOUNTER — OFFICE VISIT (OUTPATIENT)
Dept: ORTHOPEDICS | Facility: CLINIC | Age: 36
End: 2021-05-14
Payer: COMMERCIAL

## 2021-05-14 VITALS — WEIGHT: 180 LBS | HEIGHT: 69 IN | BODY MASS INDEX: 26.66 KG/M2

## 2021-05-14 DIAGNOSIS — S92.301D CLOSED FRACTURE OF BASE OF METATARSAL BONE OF RIGHT FOOT WITH ROUTINE HEALING, SUBSEQUENT ENCOUNTER: ICD-10-CM

## 2021-05-14 DIAGNOSIS — M79.671 RIGHT FOOT PAIN: Primary | ICD-10-CM

## 2021-05-14 DIAGNOSIS — S92.354A NONDISPLACED FRACTURE OF FIFTH METATARSAL BONE, RIGHT FOOT, INITIAL ENCOUNTER FOR CLOSED FRACTURE: ICD-10-CM

## 2021-05-14 PROCEDURE — 99212 OFFICE O/P EST SF 10 MIN: CPT | Performed by: FAMILY MEDICINE

## 2021-05-14 PROCEDURE — 73630 X-RAY EXAM OF FOOT: CPT | Mod: RT | Performed by: RADIOLOGY

## 2021-05-14 ASSESSMENT — MIFFLIN-ST. JEOR: SCORE: 1565.85
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
## 2021-05-27 NOTE — ANESTHESIA CARE TRANSFER NOTE
Last vitals:   Vitals:    04/15/19 1410   BP: (P) 115/59   Pulse: (P) 72   Resp: (P) 16   Temp: (P) 36.7  C (98  F)   SpO2: (P) 97%     Patient's level of consciousness is drowsy  Spontaneous respirations: yes  Maintains airway independently: yes  Dentition unchanged: yes  Oropharynx: oropharynx clear of all foreign objects    QCDR Measures:  ASA# 20 - Surgical Safety Checklist: WHO surgical safety checklist completed prior to induction    PQRS# 430 - Adult PONV Prevention: 4558F - Pt received => 2 anti-emetic agents (different classes) preop & intraop  ASA# 8 - Peds PONV Prevention: NA - Not pediatric patient, not GA or 2 or more risk factors NOT present  PQRS# 424 - Jaclyn-op Temp Management: 4559F - At least one body temp DOCUMENTED => 35.5C or 95.9F within required timeframe  PQRS# 426 - PACU Transfer Protocol: - Transfer of care checklist used  ASA# 14 - Acute Post-op Pain: ASA14B - Patient did NOT experience pain >= 7 out of 10

## 2021-05-27 NOTE — ANESTHESIA POSTPROCEDURE EVALUATION
Patient: Balta Garrido  SUCTION  DILATION AND CURETTAGE  Anesthesia type: MAC    Patient location: Phase II Recovery  Last vitals:   Vitals Value Taken Time   /63 4/15/2019  2:30 PM   Temp 36.7  C (98  F) 4/15/2019  2:10 PM   Pulse 66 4/15/2019  2:40 PM   Resp 16 4/15/2019  2:30 PM   SpO2 99 % 4/15/2019  2:40 PM   Vitals shown include unvalidated device data.  Post vital signs: stable  Level of consciousness: awake and responds to simple questions  Post-anesthesia pain: pain controlled  Post-anesthesia nausea and vomiting: no  Pulmonary: unassisted, return to baseline  Cardiovascular: stable and blood pressure at baseline  Hydration: adequate  Anesthetic events: no    QCDR Measures:  ASA# 11 - Jaclyn-op Cardiac Arrest: ASA11B - Patient did NOT experience unanticipated cardiac arrest  ASA# 12 - Jaclyn-op Mortality Rate: ASA12B - Patient did NOT die  ASA# 13 - PACU Re-Intubation Rate: NA - No ETT / LMA used for case  ASA# 10 - Composite Anes Safety: ASA10A - No serious adverse event    Additional Notes:

## 2021-05-31 VITALS — WEIGHT: 181 LBS | HEIGHT: 69 IN | BODY MASS INDEX: 26.81 KG/M2

## 2021-05-31 VITALS — BODY MASS INDEX: 27.29 KG/M2 | HEIGHT: 68 IN | WEIGHT: 180.1 LBS

## 2021-05-31 VITALS — WEIGHT: 181 LBS | BODY MASS INDEX: 25.91 KG/M2 | HEIGHT: 70 IN

## 2021-05-31 VITALS — WEIGHT: 181 LBS | BODY MASS INDEX: 26.16 KG/M2

## 2021-06-02 ENCOUNTER — RECORDS - HEALTHEAST (OUTPATIENT)
Dept: ADMINISTRATIVE | Facility: CLINIC | Age: 36
End: 2021-06-02

## 2021-06-03 VITALS — HEIGHT: 69 IN | WEIGHT: 190 LBS | BODY MASS INDEX: 28.14 KG/M2

## 2021-06-03 DIAGNOSIS — S92.301D CLOSED FRACTURE OF BASE OF METATARSAL BONE OF RIGHT FOOT WITH ROUTINE HEALING, SUBSEQUENT ENCOUNTER: Primary | ICD-10-CM

## 2021-06-04 ENCOUNTER — OFFICE VISIT (OUTPATIENT)
Dept: ORTHOPEDICS | Facility: CLINIC | Age: 36
End: 2021-06-04
Payer: COMMERCIAL

## 2021-06-04 ENCOUNTER — ANCILLARY PROCEDURE (OUTPATIENT)
Dept: GENERAL RADIOLOGY | Facility: CLINIC | Age: 36
End: 2021-06-04
Payer: COMMERCIAL

## 2021-06-04 VITALS — WEIGHT: 220 LBS | HEIGHT: 70 IN | BODY MASS INDEX: 31.5 KG/M2

## 2021-06-04 VITALS — HEIGHT: 69 IN | WEIGHT: 180 LBS | BODY MASS INDEX: 26.66 KG/M2

## 2021-06-04 DIAGNOSIS — S92.301D CLOSED FRACTURE OF BASE OF METATARSAL BONE OF RIGHT FOOT WITH ROUTINE HEALING, SUBSEQUENT ENCOUNTER: Primary | ICD-10-CM

## 2021-06-04 DIAGNOSIS — S92.211D CLOSED DISPLACED FRACTURE OF CUBOID OF RIGHT FOOT WITH ROUTINE HEALING, SUBSEQUENT ENCOUNTER: ICD-10-CM

## 2021-06-04 DIAGNOSIS — S92.301D CLOSED FRACTURE OF BASE OF METATARSAL BONE OF RIGHT FOOT WITH ROUTINE HEALING, SUBSEQUENT ENCOUNTER: ICD-10-CM

## 2021-06-04 PROCEDURE — 73630 X-RAY EXAM OF FOOT: CPT | Mod: RT | Performed by: RADIOLOGY

## 2021-06-04 PROCEDURE — 99214 OFFICE O/P EST MOD 30 MIN: CPT | Performed by: FAMILY MEDICINE

## 2021-06-04 ASSESSMENT — MIFFLIN-ST. JEOR: SCORE: 1565.85
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
## 2021-06-07 NOTE — ANESTHESIA CARE TRANSFER NOTE
Last vitals:   Vitals:    04/03/20 1108   BP: 111/55   Pulse: 72   Resp: 16   Temp: 36.5  C (97.7  F)   SpO2: 97%     Patient's level of consciousness is awake  Spontaneous respirations: yes  Maintains airway independently: yes  Dentition unchanged: yes  Oropharynx: oropharynx clear of all foreign objects    QCDR Measures:  ASA# 20 - Surgical Safety Checklist: WHO surgical safety checklist completed prior to induction    PQRS# 430 - Adult PONV Prevention: NA - Not adult patient, not GA or 3 or more risk factors NOT present  ASA# 8 - Peds PONV Prevention: NA - Not pediatric patient, not GA or 2 or more risk factors NOT present  PQRS# 424 - Jaclyn-op Temp Management: 4559F - At least one body temp DOCUMENTED => 35.5C or 95.9F within required timeframe  PQRS# 426 - PACU Transfer Protocol:NA - Patient did not go to PACU  ASA# 14 - Acute Post-op Pain: ASA14B - Patient did NOT experience pain >= 7 out of 10

## 2021-06-07 NOTE — ANESTHESIA POSTPROCEDURE EVALUATION
Patient: Balta Garrido  Procedure(s):  REPEAT  SECTION; POSTPARTUM BILATERAL TUBAL LIGATION  Anesthesia type: spinal    Patient location: PACU  Last vitals:   Vitals Value Taken Time   /73 4/3/2020  1:40 PM   Temp 36.5  C (97.7  F) 4/3/2020 11:08 AM   Pulse 57 4/3/2020  1:40 PM   Resp 16 4/3/2020  1:40 PM   SpO2 100 % 4/3/2020  1:40 PM     Post vital signs: stable  Level of consciousness: awake and responds to simple questions  Post-anesthesia pain: pain controlled  Post-anesthesia nausea and vomiting: no  Pulmonary: unassisted, return to baseline  Cardiovascular: stable and blood pressure at baseline  Hydration: adequate  Anesthetic events: no    QCDR Measures:  ASA# 11 - Jaclyn-op Cardiac Arrest: ASA11B - Patient did NOT experience unanticipated cardiac arrest  ASA# 12 - Jaclyn-op Mortality Rate: ASA12B - Patient did NOT die  ASA# 13 - PACU Re-Intubation Rate: ASA13B - Patient did NOT require a new airway mgmt  ASA# 10 - Composite Anes Safety: ASA10A - No serious adverse event    Additional Notes:

## 2021-06-07 NOTE — ANESTHESIA PROCEDURE NOTES
Peripheral Block    Patient location during procedure: OR  Start time: 4/3/2020 10:50 AM  End time: 4/3/2020 10:53 AM  post-op analgesia per surgeon order as noted in medical record  Staffing:  Performing  Anesthesiologist: Elio Tierney IV, MD  Preanesthetic Checklist  Completed: patient identified, site marked, risks, benefits, and alternatives discussed, timeout performed, consent obtained, at patient's request, airway assessed, oxygen available, suction available, emergency drugs available and hand hygiene performed  Peripheral Block  Block type: other, TAP  Prep: ChloraPrep  Patient position: supine  Patient monitoring: cardiac monitor, continuous pulse oximetry, heart rate and blood pressure  Laterality: bilateral  Injection technique: ultrasound guided    Ultrasound used to visualize needle placement in proximity to nerve being blocked: yes   US used to visualize anesthetic spread  Visualized anatomic structures normal  No Pathological Findings  Permanent ultrasound image captured for medical record  Sterile gel and probe cover used for ultrasound.  Needle  Needle type: echogenic   Needle gauge: 21 G  Needle length: 4 in  no peripheral nerve catheter placed  Assessment  Injection assessment: no difficulty with injection, negative aspiration for heme, no paresthesia on injection and incremental injection

## 2021-06-07 NOTE — TELEPHONE ENCOUNTER
OB Follow Up Phone Call    Patient: Balta Garrido  : 1985  MRN: [id number]     Location [facility]  Provider Deyanira Sinha MD      :   N/A    Language:   English    Discharge Follow-Up:  Follow-Up call by Outreach nurse: Message left for patient    Type of Delivery:      Feeding Method:  Breastfeeding    Comments:   Left message with [facility] Outreach phone number for patient to call back if desired. Reminded patient to schedule postpartum follow-up appointment as directed by PCP at discharge.  Encouraged patient to call clinic/PCP with questions or concerns.    Completed by:   Chiara Reardon RN      Patient: Balta Garrido  : 1985  MRN: [id number]

## 2021-06-07 NOTE — ANESTHESIA PREPROCEDURE EVALUATION
Anesthesia Evaluation      Patient summary reviewed   No history of anesthetic complications     Airway   Mallampati: II  Neck ROM: full   Pulmonary                           Cardiovascular - negative ROS   Neuro/Psych - negative ROS     Endo/Other - negative ROS      GI/Hepatic/Renal - negative ROS           Dental                         Anesthesia Plan  Planned anesthetic: spinal and peripheral nerve block  TAPS  ASA 3     Anesthetic plan and risks discussed with: patient

## 2021-06-07 NOTE — ANESTHESIA PROCEDURE NOTES
Spinal Block    Patient location during procedure: OR  Start time: 4/3/2020 9:40 AM  End time: 4/3/2020 9:42 AM  Reason for block: primary anesthetic    Staffing:  Performing  Anesthesiologist: Elio Tierney IV, MD    Preanesthetic Checklist  Completed: patient identified, risks, benefits, and alternatives discussed, timeout performed, consent obtained, at patient's request, airway assessed, oxygen available, suction available, emergency drugs available and hand hygiene performed  Spinal Block  Patient position: sitting  Prep: ChloraPrep  Patient monitoring: heart rate, cardiac monitor, continuous pulse ox and blood pressure  Approach: midline  Location: L3-4  Injection technique: single-shot  Needle type: pencil-tip   Needle gauge: 24 G

## 2021-06-12 NOTE — PROGRESS NOTES
[facility] Daily Progress Note  Patient Name: Balta Garrido  Date of evaluation: 8/30/2017  Today's Date: 9/13/2017  Visit Number: 2 of 12  Referring provider: Dr. Sharon Huffman  Referral Diagnosis: Left-sided low back pain with left-sided sciatica [M54.42]  - Primary   Visit Diagnosis:     ICD-10-CM    1. Acute left-sided low back pain with left-sided sciatica M54.42        Assessment:       Patient is slowly improving with her left low back/radicular symptoms. She does continue to experience peripheralization with sitting and in the mornings when starting to move.    Goals:  Pt. will demonstrate/verbalize independence in self-management of condition in : 6 weeks  Pt. will be independent with home exercise program in : 6 weeks  Pt will: sleep without increase in LBP in 6 weeks  Pt will: return to regular exercise ie: long walks without increase of radicular symptoms in 6 weeks  Pt will: decrease redicular symptoms to be 0/10 when sitting for 30 minutes to study in 6 weeks     Plan:      Plan for next visit: MT for left gluts/piriformis, manual LE axial distraction, etc.      Subjective:        The back isn't painful, The left leg is the worst.   Has been doing exercises  Has switched to naproxen instead of ibuprofen.  Overall, feels like the stretches are helping, but still has a lot of pain in left leg especially when sitting     Objective:       Able to heel/toe walk, mild pain with heel walk, this is much improved from initial visit.     Patient without tenderness to lumbar spine, tender to left glut/piriformis and left peroneals/gastroc    Exercises: see flowsheet for date performed in clinic  Exercise #1: discussed changing positions often!  Comment #1: supine SKTC change to pulling across body  Exercise #2: supine DKTC can DC  Comment #2: supine SLR slider  Exercise #3: supine LTR  Comment #3: seated slump slider and standing slider  Exercise #4: prone press ups 10x  Comment #4: quadruped  rocking back and forth  Exercise #5: gastroc stretch        Treatment Today     TREATMENT MINUTES COMMENTS   Evaluation     Self-care/ Home management     Manual therapy 20 Left piriformis STM, left peroneals/gastroc SMT, left LE axial distraction in supine and prone   Neuromuscular Re-education     Therapeutic Activity     Therapeutic Exercises 10 Verbal review of exercises to keep working on, added gastroc stretch   Gait training     Modality__________________                Total 30    Blank areas are intentional and mean the treatment did not include these items.        HEMANT BrunnerT, CLT  9/13/2017  1:57 PM

## 2021-06-12 NOTE — PROGRESS NOTES
[facility]   Lumbar Initial Evaluation  Patient Name: Balta Garrido  Date of evaluation: 8/30/2017  Today's Date: 8/30/2017  Visit Number: 1 of 12  Referring provider: Dr. Sharon Huffman  Referral Diagnosis: Left-sided low back pain with left-sided sciatica [M54.42]  - Primary   Visit Diagnosis:     ICD-10-CM    1. Acute left-sided low back pain with left-sided sciatica M54.42        Assessment:      Balta is a 32 y.o. female who presents to physical therapy today with complaints of left sided radicular low back pain that began a few months ago without known injury. Clinical exam today reveals s/s consisent with left L5/S1 radiculopathy, + SLR, + Slump, centralizes with extension. Patient would benefit from further PT in order to improve function. Patient responded positively to MT done today and understand HEP to start working on regularly.     Patient's expectations/goals are: Realistic  Barriers to Learning or Achieving Goals: none    Goals:  Pt. will demonstrate/verbalize independence in self-management of condition in : 6 weeks  Pt. will be independent with home exercise program in : 6 weeks  Pt will: sleep without increase in LBP in 6 weeks  Pt will: return to regular exercise ie: long walks without increase of radicular symptoms in 6 weeks  Pt will: decrease redicular symptoms to be 0/10 when sitting for 30 minutes to study in 6 weeks     Plan:      Plan for next visit: MT for left side low back, manual LE axial distraction, etc.     Plan of Care:   Authorization / Certification Start Date: 08/30/17  Authorization / Certification End Date: 11/30/17  Authorization / Certification Number of Visits: 12  Communication with: Referral Source  Patient Related Instruction: Nature of Condition;Treatment plan and rationale;Self Care instruction;Basis of treatment;Body mechanics;Posture  Discharge Planning: to self care/HEP  Precautions / Restrictions : none  Therapeutic Exercise:  ROM;Stretching;Strengthening  Neuromuscular Reeducation: posture;kinesio tape;core  Manual Therapy: soft tissue mobilization;myofascial release;joint mobilization;muscle energy  Modalities: traction;electrical stimulation;TENS  Gait Training: as needed     Subjective:       History of Present Illness:    Balta is a 32 y.o. female who presents to physical therapy today with complaints of LBP that began a few months ago into left leg as well. Started taking ibuprofen a few times a day because it was getting worse. She powered through the pain d/t getting through the end of grad school. She is now saying it's getting worse, was just on left side in back, now is going down entire left leg. Pt reports symptoms are better with ibuprofen (though isn't working any more), walking is ok and worse with laying down, sitting especially with legs up on ottoman.   Patient denies urinary or bowel incontinence, unintentional weight loss, saddle anesthesia, fever or chills, balance difficulties.    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    Pain rating today:3  Pain rating at best: 0  Pain rating at worst: 9  Pain description: pulsating ache    Functional limitations:  Sitting, working, child car, sleeping     Objective:      Note: Items left blank indicates the item was not performed or not indicated at the time of the evaluation.    Lumbopelvic Examination  1. Acute left-sided low back pain with left-sided sciatica         Precautions/Restrictions: None    Involved side: Left    Observation: patient uncomfortable sitting, stands most of the visit    Lumbar ROM:    Flexion: slight bend forward reproduces symptoms in LE right away.  Extension: moderate limit and shoots down left leg sharply  Side bending: WNL and no symptoms    Lower Extremity Strength:   5/5 bilat hip flexion, knee ext, knee flex, DF, great toe ext, and can heel/toe  walk    Sensation    NT today, reports lateral foot, leg, posterior thigh/glut pain     Lumbar Special Tests:   SLR: 70R, 45L with pain  Slump: +L with just head flexion and +L with left knee ext and no head flexion, +R for left leg symptoms.    Repeated Motion Testing: decrease in leg symptoms with extension, significant increase in symptoms with flexion    Passive Mobility - Joint Integrity: NT today    LE Screen: good hip ROM     Palpation: tender to left SIJ, piriformis, left QL    Patient Outcome Measures:    Modified Oswestry Low Back Pain Disablity Questionnaire  in %: 42   Scores range from 0-100%, where a score of 0% represents minimal pain and maximal function. The minimal clinically important difference is a score reduction of 12%.    Treatment Today     TREATMENT MINUTES COMMENTS   Evaluation 30 lumbar   Self-care/ Home management     Manual therapy 15 Left piriformis, left LE axial distraction   Neuromuscular Re-education     Therapeutic Activity     Therapeutic Exercises 15 See ex flowsheet   Gait training     Modality__________________                Total 60    Blank areas are intentional and mean the treatment did not include these items.   PT Evaluation Code: (Please list factors)  Patient History/Comorbidities: none  Examination: low back  Clinical Presentation: stalbe  Clinical Decision Making: low    Patient History/  Comorbidities Examination  (body structures and functions, activity limitations, and/or participation restrictions) Clinical Presentation Clinical Decision Making (Complexity)   No documented Comorbidities or personal factors 1-2 Elements Stable and/or uncomplicated Low   1-2 documented comorbidities or personal factor 3 Elements Evolving clinical presentation with changing characteristics Moderate   3-4 documented comorbidities or personal factors 4 or more Unstable and unpredictable High          Chiara Mike DPT, CLT  8/30/2017  1:57 PM

## 2021-06-13 NOTE — PROGRESS NOTES
[facility] Daily Progress Note  Patient Name: Balta Garrido  Date of evaluation: 8/30/2017  Today's Date: 9/21/2017  Visit Number: 3 of 12  Referring provider: Dr. Sharon Huffman  Referral Diagnosis: Left-sided low back pain with left-sided sciatica [M54.42]  - Primary   Visit Diagnosis:     ICD-10-CM    1. Acute left-sided low back pain with left-sided sciatica M54.42        Assessment:       Patient was doing well, but now has increased pain in her left lower back and left leg. She is frustrated with where she is at and has an exam next week that she is worried about having to sit for 3 hours to take it. She would benefit from a standing location if possible.   Patient should call her referring provider to discuss current options.     Goals:  Pt. will demonstrate/verbalize independence in self-management of condition in : 6 weeks  Pt. will be independent with home exercise program in : 6 weeks  Pt will: sleep without increase in LBP in 6 weeks  Pt will: return to regular exercise ie: long walks without increase of radicular symptoms in 6 weeks  Pt will: decrease redicular symptoms to be 0/10 when sitting for 30 minutes to study in 6 weeks     Plan:      Plan for next visit: MT for left gluts/piriformis, manual LE axial distraction, etc.      Subjective:        The back is more painful now along with the left.   Has been doing exercises regulalry, but very concerned about taking her board exam d/t not being able to sit for more than a few minutes right now.     Objective:       Able to heel/toe walk, mild pain with heel walk, this is much improved from initial visit.     Patient without tenderness to lumbar spine, tender to left glut/piriformis and left peroneals/gastroc    Exercises: see flowsheet for date performed in clinic  Exercise #1: discussed changing positions often!  Comment #1: supine SKTC change to pulling across body  Exercise #2: supine DKTC can DC  Comment #2: supine SLR slider  Exercise  #3: supine LTR  Comment #3: seated slump slider and standing slider  Exercise #4: prone press ups 10x  Comment #4: quadruped rocking back and forth  Exercise #5: gastroc stretch        Treatment Today     TREATMENT MINUTES COMMENTS   Evaluation     Self-care/ Home management     Manual therapy 30 Left piriformis STM, left peroneals/gastroc SMT, left LE axial distraction in supine and prone   Neuromuscular Re-education     Therapeutic Activity     Therapeutic Exercises     Gait training     Modality__________________                Total 30    Blank areas are intentional and mean the treatment did not include these items.        Chiara Mike, HEMANTT, CLT  9/21/2017  1:57 PM

## 2021-06-13 NOTE — PROGRESS NOTES
[facility] Daily Progress Note  Patient Name: Balta Garrido  Date of evaluation: 8/30/2017  Today's Date: 10/2/2017  Visit Number: 4 of 12  Referring provider: Dr. Sharon Huffman  Referral Diagnosis: Left-sided low back pain with left-sided sciatica [M54.42]  - Primary   Visit Diagnosis:     ICD-10-CM    1. Acute left-sided low back pain with left-sided sciatica M54.42        Assessment:       Patient is back on her ibuprofen, she had to get through her boards exam. She is going to f/u with Dr. Hernandez this week and discussed other treatment options. Patient's progress will likely improve over the next few weeks d/t not having to sit for prolonged periods to study.     Goals:  Pt. will demonstrate/verbalize independence in self-management of condition in : 6 weeks  Pt. will be independent with home exercise program in : 6 weeks  Pt will: sleep without increase in LBP in 6 weeks  Pt will: return to regular exercise ie: long walks without increase of radicular symptoms in 6 weeks  Pt will: decrease redicular symptoms to be 0/10 when sitting for 30 minutes to study in 6 weeks     Plan:      Plan for next visit: MT for left gluts/piriformis, manual LE axial distraction, etc.      Subjective:        Had to sit to take her test last week.   Still having a hard time supine SLR d/t tightness.   Still having cramping in left calf.   Has been taking Ibuprofen a lot lately.      Objective:       Able to heel/toe walk, mild pain with heel walk, this is much improved from initial visit.     Patient tender to lumbar spine, tender to left glut/piriformis and left peroneals/gastroc.    Continues to have neural tension with SLR to about 45 deg.     Exercises: see flowsheet for date performed in clinic  Exercise #1: discussed changing positions often!  Comment #1: supine SKTC change to pulling across body  Exercise #2: supine DKTC can DC  Comment #2: supine SLR slider  Exercise #3: supine LTR  Comment #3: seated slump  slider and standing slider  Exercise #4: prone press ups 10x  Comment #4: quadruped rocking back and forth  Exercise #5: gastroc stretch        Treatment Today     TREATMENT MINUTES COMMENTS   Evaluation     Self-care/ Home management     Manual therapy 30 Left piriformis STM, left peroneals/gastroc SMT, left LE axial distraction in supine and prone   Neuromuscular Re-education     Therapeutic Activity     Therapeutic Exercises     Gait training     Modality__________________                Total 30    Blank areas are intentional and mean the treatment did not include these items.        HEMANT BrunnerT, CLT  10/2/2017  1:57 PM

## 2021-06-13 NOTE — PROGRESS NOTES
[facility] Daily Progress Note  Patient Name: Balta Garrido  Date of evaluation: 8/30/2017  Today's Date: 10/18/2017  Visit Number: 5 of 12  Referring provider: Dr. Sharon Huffman  Referral Diagnosis: Left-sided low back pain with left-sided sciatica [M54.42]  - Primary   Visit Diagnosis:     ICD-10-CM    1. Acute left-sided low back pain with left-sided sciatica M54.42        Assessment:       Patient has been working with PT/doing HEP and activity modification for her left sided LBP, glut pain, calf pain and tingling symptoms for 6 weeks. Her original pain started in May 2017, about 5 months ago. She has been able to do nerve glides, prone progression, stretches, and manual therapy with manual distraction and soft tissue mobilization without increased pain and they actually seem to help her symptoms mildly. She reports she has tried going without ibuprofen for a few weeks, but is unable to get through her day without it, so has returned to taking it. She had an MRI this week confirming a disc extrusion that correlates with her symptoms. She is going to f/u with spine provider on 10- to discuss further treatment options at this time. Patient may return to PT if needed, but we have been working at it without significant improvement.   Patient's right SLR went from 70 deg with crossover response at eval to 90 deg without crossover response.  Her left SLR remains 45 degrees with + for her leg/back symptoms.   Continues to have a positive slump with just head movement and with just leg movement including crossover response.   She has muscle tension in left QL, glut, gastroc and peroneals.   She doesn't have any weakness at this time in L LE and denies loss of bowel and bladder control.   Reflexes weren't assessed at today's visit.   Will keep patient's chart open if she needs to return.     Goals:  Pt. will demonstrate/verbalize independence in self-management of condition: PROGRESSING  Pt. will be  independent with home exercise program: PROGRESSING  Pt will: sleep without increase in LBP: NOT MET  Pt will: return to regular exercise ie: long walks without increase of radicular symptoms: NOT MET  Pt will: decrease redicular symptoms to be 0/10 when sitting for 30 minutes to study: NOT MET     Plan:      Patient is going to see spine PA tomorrow to discuss further treatment options. Will keep PT chart open if she needs to return. Patient to continue with ehr current nerve gliding/stretching routine.     Subjective:        Patient continues to have significant pain and limitations with left leg symptoms.   Unable to do any yoga/exercises. Doing all HEP.      Objective:       Able to heel/toe walk, pain with heel walk    Patient tender to lumbar spine, tender to left glut/piriformis and left peroneals/gastroc.    Continues to have neural tension with SLR to about 45 deg, right LE is 90 degrees without crossover response.    Extension repeats: centralizes  Flexion repeats: peripheralizes    Slump: + for left LE pain with right and left movement.    Exercises: see flowsheet for date performed in clinic  Exercise #1: discussed changing positions often!  Comment #1: supine SKTC change to pulling across body  Exercise #2: supine DKTC can DC  Comment #2: supine SLR slider  Exercise #3: supine LTR  Comment #3: seated slump slider and standing slider  Exercise #4: prone press ups 10x  Comment #4: quadruped rocking back and forth  Exercise #5: gastroc stretch        Treatment Today     TREATMENT MINUTES COMMENTS   Evaluation     Self-care/ Home management     Manual therapy 15 Left piriformis STM, left peroneals/gastroc SMT, left LE axial distraction in supine and prone   Neuromuscular Re-education     Therapeutic Activity     Therapeutic Exercises 15 Time included for reassessment.   Gait training     Modality__________________                Total 30    Blank areas are intentional and mean the treatment did not include  these items.        HEMANT BrunnerT, CLT  10/18/2017  1:57 PM

## 2021-06-13 NOTE — PROGRESS NOTES
ASSESSMENT: Balta Garrido is a 32 y.o. female with past medical history significant for anxiety, depression who presents today for new patient evaluation of a 5 month history of left low back pain and a 2 month history of pain radiating down the left leg.  MRI lumbar spine shows transitional anatomy with a lumbarized S1 segment and a rudimentary S1-S2 disc space.  At L5-S1 there is a moderate left paracentral disc extrusion with severe impingement upon the traversing left S1 nerve root.  The patient demonstrated a slight sensory deficit in both the L5 and S1 dermatome on exam today.  Otherwise, she is neurologically intact.    WU: 32  WHO 5: 11    PLAN:  A shared decision making model was used.  The patient's values and choices were respected.  The following represents what was discussed and decided upon by the physician assistant and the patient.      1.  DIAGNOSTIC TESTS: I reviewed the MRI lumbar spine.  No further diagnostic tests were ordered.    2.  PHYSICAL THERAPY: The patient is currently in physical therapy.  She has had 5 sessions so far.  I encouraged her to continue doing her home exercises.    3.  MEDICATIONS: No changes are made to the patient's medications.  We could consider using gabapentin in the future.    4.  INTERVENTIONS: I offered the patient a left L5-S1, S1-S2 transforaminal epidural steroid injection.  She indicated she would like to proceed and an order was placed.    5.  PATIENT EDUCATION: The patient I discussed that if she fails to improve with a left L5-S1, S1-S2 transforaminal epidural steroid injection, I would refer her to a spine surgeon.    -The patient is in agreement with the above plan.  All questions were answered.    6.  FOLLOW-UP:   The patient return to the clinic for a left L5-S1, S1-S2 transforaminal epidural steroid injection.  She has any questions or concerns in the meantime, she should not hesitate to contact our clinic.      SUBJECTIVE:   Balta Garrido  Is a 32  y.o. female who presents today in consultation at the request of Dr. Huffman for new patient evaluation of low back pain with radiation into the left lower extremity.  The patient states that she has a history of intermittent low back pain.  She states with her previous episodes she would have back pain that lasted a few days and would resolve on its own.  She had one episode last September which required the use of muscle relaxants.  In May, she woke up with what she thought was her typical low back pain, however, her pain persisted and got progressively more severe.  The gradually started to radiate down into her buttock.  2 months ago, she was awoken in the middle of the night by severe pain radiating all the way down the left leg to the ankle.  The patient denies any significant injury or event to cause the pain.  The patient was referred to physical therapy. She has had 5 sessions of physical therapy and does her home exercises, however, her pain is persisting.    The patient complains of left-sided low back pain.  The pain radiates in the left buttock, down the left posterior lateral thigh, and extends into the lateral calf to the ankle.   MRI lumbar spine was obtained and she was referred to our clinic for further evaluation and treatment.  She sometimes feels the pain extends into the left lateral foot.  She has a numb sensation and hypersensitivity in the same distribution as her pain.  She has occasional cramps in the left leg.  She denies weakness in the leg.  Her pain is aggravated with  prolonged sitting, driving, especially in the morning, and bending.  It is alleviated with walking.  She denies any right-sided symptoms.  She denies any loss of bowel or bladder control.    The patient had 5 sessions of physical therapy.  She is doing her home exercises.  She has not had chiropractic treatment.  She has never had any spine surgery or spine injections.  She uses ibuprofen 800 mg 3 times daily.  The  patient prefers not to take medications if possible.    Current Outpatient Prescriptions   Medication Sig Dispense Refill     buPROPion (WELLBUTRIN XL) 300 MG 24 hr tablet Take 300 mg by mouth daily.  1       Allergies   Allergen Reactions     Oxycodone-Acetaminophen      Annotation: itchyprobably histamine release       Patient Active Problem List   Diagnosis     Allergic Rhinitis     Acne     Mild Intermittent Asthma     Anxiety     Post-traumatic Stress Disorder       Past Surgical History:   Procedure Laterality Date     AR REMOVAL OF TONSILS,<9 Y/O      Description: Tonsillectomy;  Recorded: 2008;   Liposuction 2006   section 2013  Tooth extraction    Family history: Father has diabetes, father has heart disease, mother had a stroke    Social history: The patient is .  She has a son who will return for next week.  She is a registered nurse at [employer].  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  She drinks alcohol occasionally.  She denies tobacco use.  She denies illicit drug use.    ROS: Positive for nosebleeds, constipation, poor sleep quality, back pain, leg pain, depression/worry.  Specifically negative for bowel/bladder dysfunction, fevers,chills, appetite changes, unexplained weight loss.   Otherwise 13 systems reviewed are negative.  Please see the patient's intake questionnaire from today for details.      OBJECTIVE:  PHYSICAL EXAMINATION:    CONSTITUTIONAL:  Vital signs as above.  No acute distress.  The patient is well nourished and well groomed.  PSYCHIATRIC:  The patient is awake, alert, oriented to person, place, time and answering questions appropriately with clear speech.    HEENT: Normocephalic, atraumatic.  Sclera clear.  Neck is supple.  SKIN:  Skin over the face, bilateral lower extremities, and posterior torso is clean, dry, intact without rashes.    GAIT:  Gait is non-antalgic.  The patient is able to heel and toe  walk without significant difficulty.    STANDING EXAMINATION: Tender to palpation of the left lower lumbar paraspinous muscles.  Lumbar flexion severely restricted.  Lumbar extension within normal limits.  MUSCLE STRENGTH:  The patient has 5/5 strength for the bilateral hip flexors, knee flexors/extensors, ankle dorsiflexors/plantar flexors, great toe extensors, ankle evertors/invertors.  NEUROLOGICAL: 1+ patellar, and achilles reflexes bilaterally.  Negative Babinski's bilaterally.  No ankle clonus bilaterally.  Subjective diminished/altered sensation over the left L5 and S1 dermatomes.  VASCULAR:  2/4 dorsalis pedis pulses bilaterally.  Bilateral lower extremities are warm.  There is no pitting edema of the bilateral lower extremities.  ABDOMINAL:  Soft, non-distended, non-tender throughout all quadrants.  No pulsatile mass palpated in the left lower quadrant.  LYMPH NODES:  No palpable or tender inguinal lymph nodes.  MUSCULOSKELETAL: Straight leg raise is positive on the left.  Straight leg raise is positive on the right to reproduce left low back pain.    RESULTS: Reviewed the MRI lumbar spine from [facility] dated October 16, 2017.  This shows transitional anatomy with a lumbarized S1 and rudimentary S1-S2 disc space.  There is 2 mm of degenerative retrolisthesis of L5 on the sacrum.  At L5-S1 there is a moderate sized left paracentral disc extrusion which extends into the left lateral recess and results in severe impingement of the traversing left S1 nerve root.  There is also a tiny left paracentral disc extrusion at L4-5 without resulting neural impingement.  Please see report for further details.

## 2021-06-13 NOTE — PROGRESS NOTES
Assessment:   Balta Garrido is a 32 y.o. y.o. female with past medical history significant for anxiety, depression who presents today for follow-up regarding a 6 month history of left low back pain and a 2-3 month history of pain radiating down the left leg.  MRI lumbar spine shows transitional anatomy with a lumbarized S1 segment and rudimentary S1-S2 disc space.  At L5-S1 there is a moderate left paracentral disc extrusion with severe impingement upon the traversing left S1 nerve root.  The patient is status post a left L5-S1, S1-S2 transforaminal epidural steroid injection on October 23, 2017 which provided significant relief of her pain but only lasted as long as the local anesthetic was working.  After local anesthetic wore off, her pain returned to baseline.  She has had increased paresthesias since she was last seen.       Plan:     A shared decision making plan was used.  The patient's values and choices were respected.  The following represents what was discussed and decided upon by the physician assistant and the patient.      1.  DIAGNOSTIC TESTS: I reviewed the MRI lumbar spine.  No further diagnostic tests were ordered.    2.  PHYSICAL THERAPY: The patient attended 5 sessions of physical therapy.  I encouraged to continue doing her home exercises.    3.  MEDICATIONS: No changes are made to the patient's medications.  She can continue using ibuprofen as needed.  We briefly discussed trying gabapentin, but we decided to hold off.    4.  INTERVENTIONS: No further interventions were ordered.    5.  REFERRALS: I referred the patient to neurosurgery since her symptoms have been refractory to conservative treatment.  The patient will prefer to see Dr. Mcgill or Dr. Tong.    6.  FOLLOW-UP: The patient follow-up with me as needed.  We will await recommendations from neurosurgery.  If she has any questions or concerns, she should not hesitate to call.    Subjective:     Balta Garrido is a 32 y.o. female  who presents today for follow-up regarding left low back pain with radiation into the left lower extremity.  Patient status post a left L5-S1, S1-S2 transforaminal epidural steroid injection on October 23, 2017.  The patient reports that she has significant relief of her pain for several hours after the injection (as long as the local anesthetic was working).  Was a local anesthetic wore off, her pain returned to baseline.  Check she had increased pain for a couple days after the injection.  Since the injection she is also had increased paresthesias.    The patient complains of left-sided low back pain.  Pain radiates in the left buttock, down the left posterolateral thigh, into the posterior lateral calf, extending into the lateral foot.  She has intermittent numbness and tingling in the same distribution as her pain which extends into the lateral foot, affecting the fourth and fifth toes.  The patient rates her pain today as a 6 out of 10.  At its best it is a 1-10.  At its worst it is an 8 out of 10.  The patient's pain is aggravated with sitting and alleviated with repositioning.  She denies weakness in the leg.    The patient attended 5 sessions of physical therapy.  She is doing her home exercises.  She uses ibuprofen 800 mg 2-3 times per day.    Past medical history is reviewed and is unchanged in the interim.    Family history is reviewed and is unchanged in the interim.    Review of Systems:  Positive for numbness/tingling.  Negative loss of bowel/bladder control, footdrop, weakness, headache, dizziness, nausea/vomiting, blurry vision, balance changes.     Objective:   CONSTITUTIONAL:  Vital signs as above.  No acute distress.  The patient is well nourished and well groomed.    PSYCHIATRIC:  The patient is awake, alert, oriented to person, place and time.  The patient is answering questions appropriately with clear speech.  Normal affect.  HEENT: Normocephalic, atraumatic.  Sclera clear.    SKIN:  Skin over  the face, posterior torso, bilateral upper and lower extremities is clean, dry, intact without rashes.  MUSCULOSKELETAL:  Gait is non-antalgic.  T The patient has 5/5 strength for the bilateral hip flexors, knee flexors/extensors, ankle dorsiflexors/plantar flexors, ankle evertors/invertors.    NEUROLOGICAL: Subjective diminished/altered sensation of the left S1 dermatome.     RESULTS:  Reviewed the MRI lumbar spine from [facility] dated October 16, 2017.  This shows transitional anatomy with a lumbarized S1 and rudimentary S1-S2 disc space.  There is 2 mm of degenerative retrolisthesis of L5 on the sacrum.  At L5-S1 there is a moderate sized left paracentral disc extrusion which extends into the left lateral recess and results in severe impingement of the traversing left S1 nerve root.  There is also a tiny left paracentral disc extrusion at L4-5 without resulting neural impingement.  Please see report for further details.

## 2021-06-14 NOTE — ANESTHESIA PREPROCEDURE EVALUATION
Anesthesia Evaluation      Patient summary reviewed   No history of anesthetic complications     Airway   Mallampati: II  Neck ROM: full   Pulmonary - normal exam   (+) asthma  moderate,  well controlled,                          Cardiovascular - negative ROS and normal exam   Neuro/Psych    (+) depression, anxiety/panic attacks,     Comments: Herniated lumbar disc - pain, numbness/weakness in left leg      Endo/Other - negative ROS      GI/Hepatic/Renal - negative ROS           Dental - normal exam                        Anesthesia Plan  Planned anesthetic: general endotracheal  - scop patch  ASA 2   Induction: intravenous   Anesthetic plan and risks discussed with: patient  Anesthesia plan special considerations: antiemetics,   Post-op plan: routine recovery

## 2021-06-14 NOTE — PROGRESS NOTES
NEUROSURGERY CONSULTATION NOTE    11/29/2017     Balta Garrido is a 32 y.o. female who is sent to us in consultation by Elsa Yu     for evaluation of LBP and left leg pain     The pt describes the symptoms as having  started  In early August with extreeme pain down the left leg posterior aspect to the side of her foot and little toe.    No inciting event.  Had to take 800 TID ibuprofen, did PT (which by the 3rd session wasn't working) [facility] had a MARGARETH at Left L45 and L5S1 which caused numbness from the lidocaine but no change in her pain.    Pt describes no  weakness but she has intermittent numbness with bending over to shave her legs, or sit on the toilet.   Pain is worse with bending and sitting  and better with  Laying flat.        HPI:     Past Medical History:   Diagnosis Date     Asthma      Depression      Low back pain      Past Surgical History:   Procedure Laterality Date     MS REMOVAL OF TONSILS,<11 Y/O      Description: Tonsillectomy;  Recorded: 08/05/2008;         REVIEW OF SYSTEMS:  ROS reviewed with pt as documented on pt health form of 11/29/2017.    Negative cardiac, pulmonary (asthma), hematological with exception of   .  No family hx of anesthetic reactions   .  No family hx of hypercoagulability         MEDICATIONS:  Current Outpatient Prescriptions   Medication Sig Dispense Refill     buPROPion (WELLBUTRIN XL) 300 MG 24 hr tablet Take 300 mg by mouth daily.  1     calcium carbonate-vit D3-min 600 mg calcium- 400 unit Tab Take by mouth daily.       ibuprofen (ADVIL,MOTRIN) 800 MG tablet Take 800 mg by mouth 3 (three) times a day.       LACTOBACILLUS ACIDOPHILUS (PROBIOTIC ORAL) Take by mouth.       loratadine (CLARITIN) 10 mg tablet Take 10 mg by mouth daily.       montelukast (SINGULAIR) 10 mg tablet Take 10 mg by mouth at bedtime.       PRENATAL 21/IRON FU/FOLIC ACID (PRENATAL COMPLETE ORAL) Take by mouth.       No current facility-administered medications for this visit.   "        ALLERGIES/SENSITIVITIES:    Allergic to percocet  Allergies   Allergen Reactions     Oxycodone-Acetaminophen      Annotation: itchyprobably histamine release         PERTINENT SOCIAL HISTORY:    Social History     Social History     Marital status: Single     Spouse name: N/A     Number of children: N/A     Years of education: N/A     Social History Main Topics     Smoking status: Never Smoker     Smokeless tobacco: None     Alcohol use None     Drug use: None     Sexual activity: Not Asked     Other Topics Concern     None     Social History Narrative         FAMILY HISTORY:  Family History   Problem Relation Age of Onset     Stroke Mother      Alzheimer's disease Mother      Hypothyroidism Mother      Diabetes Father      Hypothyroidism Sister      Diabetes Paternal Uncle      Diabetes Paternal Grandfather         PHYSICAL EXAM:   Constitutional: /83  Pulse 88  Ht 5' 8.75\" (1.746 m)  Wt 181 lb (82.1 kg)  SpO2 98%  BMI 26.92 kg/m2     Mental Status: A & O in no acute distress.  Affect is appropriate.  Speech is fluent.  Recent and remote memory are intact.  Attention span and concentration are normal.     Cranial Nerves: CN1: grossly intact per patient recall. CN2: No funduscopic exam performed. CN3,4 & 6: Pupillary light response, lateral and vertical gaze normal.  No nystagmus.  Visual fields are full to confrontation. CN5: Intact to touch CN7: No facial weakness, smile, facial symmetry intact. CN8: Intact to spoken voice. CN9&10: Gag reflex, uvula midline, palate rises with phonation. CN11: Shoulder shrug 5/5 intact bilaterally. CN12: Tongue midline and moves freely from side to side.     Motor: No pronator drift of upper extremity. Normal bulk and tone all muscle groups of upper and lower extremities.      Sensory: Sensation intact bilaterally to light touch.       Coordination:  Heel/toe/  gait intact.       tandem gait      Reflexes; supinator, biceps, triceps, knee missing " bilaterally  ankle jerk missing on left  minimal hoffmans/     babinski/ clonus.    IMAGING: I personally reviewed all radiographic images    MRI  L5S1 with large left disk compressing the nerve      Flex/ext no instability       CONSULTATION ASSESSMENT AND PLAN:  Pt with large disk at L5S1 and nerve compression.  Lost reflex on ankle.  Failed conservative treatment, recommended L L5S1 HLMD foramenotomy.  Careful counting as there is a patent S1S2 disk .  We discussed risks and benefits and she had questions answered.     I spent more than 45 minutes in this apt, examining the pt, reviewing the scans, reviewing notes from chart, discussing treatment options with risks and benefits and coordinating care. >50 % clinic time was spent in face to face counseling and coordinating care    Sharon Tong       Cc:   Sharon Huffman MD  [address] [facility] [id number]

## 2021-06-14 NOTE — PROGRESS NOTES
[facility] Discharge Summary    Patient Name: Balta Garrido  Date: 12/6/2017  Referral Diagnosis: LBP  Referring provider: Sahron Huffman MD      Patient was seen for 5 visits from 8/30/2017 to 10/18/2017 without missed appointments.  See most recent PT note for updated status.   Patient did not improve with PT.   No further PT scheduled at this time.     Physical Therapy will be discharged at this time.    Thank you for your referral.  Chiara Mike  12/6/2017  2:27 PM

## 2021-06-14 NOTE — PROGRESS NOTES
Preop Assessment: Balta Garrido presents for pre-op review.  Surgeon: Dr. Tong  Name of Surgery: left L5-S1 HLMD  Diagnosis: disc herniation  Date of Surgery: 12/19/2017  Time of Surgery: 1030  Hospital: [facility]  H&P: by Dr. Huffman on 12/15/2017 - cleared for surgery  History of ASA, NSAIDS, vitamin and/or herbal supplements within 10 days: No  History of blood thinners: No  History of anti-seizure med's: No  Review of systems: unchanged    Diagnostics:  Labs: n/a  CXR: n/a  EKG: n/a  Other:   Films: MRI from 10/16/2017 - cleared for surgery      Last BM - this am  Nausea or Vomiting - denies  Urinary retention - denies  Pain management - no Red Flags  Home PT Evaluation: ambulates independently, steady gait and stride, no imbalance noted  - no indication for Home PT pre-op  Patient confirmed they have help/assistance in place at home upon discharge      All questions answered regarding surgery and expected pre and postoperative course including rehabilitation phase.     Reviewed with patient: Arrive 2.5 -3 hours prior to scheduled surgery, nothing to eat or drink after midnight the night before surgery and bring all pertinent films to the hospital the day of surgery.  Continue to refrain from NSAIDS (Ibuprofen, Aleve, Naprosyn) ASA or over the counter herbal medication or supplements, anticoagulants and blood thinners.    Preop skin preparations and instructions provided.      Patient confirmed they have help/assistance in place at home upon discharge    Bridget Chauhan RN, CNRN

## 2021-06-14 NOTE — ANESTHESIA POSTPROCEDURE EVALUATION
Patient: Balta Garrido  LEFT L5-S1 HEMILAMINECTOMY MICRODISCECTOMY FORAMINOTOMY  Anesthesia type: general    Patient location: phase 2  Last vitals:   Vitals:    12/19/17 1645   BP: 131/69   Pulse: 92   Resp:    Temp:    SpO2: 100%     Post vital signs: stable  Level of consciousness: awake and responds to simple questions  Post-anesthesia pain: pain controlled  Post-anesthesia nausea and vomiting: no  Pulmonary: unassisted, return to baseline  Cardiovascular: stable and blood pressure at baseline  Hydration: adequate  Anesthetic events: no    QCDR Measures:  ASA# 11 - Jaclyn-op Cardiac Arrest: ASA11B - Patient did NOT experience unanticipated cardiac arrest  ASA# 12 - Jaclyn-op Mortality Rate: ASA12B - Patient did NOT die  ASA# 13 - PACU Re-Intubation Rate: ASA13B - Patient did NOT require a new airway mgmt  ASA# 10 - Composite Anes Safety: ASA10A - No serious adverse event    Additional Notes:

## 2021-06-14 NOTE — PROGRESS NOTES
Neurosurgery consultation was requested by: CECE Boyle  Pain: Left lower back pain   Radicular Pain is present: Radiates into left buttock and down lateral side of left leg to the ankle  Lhermitte sign: No  Motor complaints: Denies weakness   Sensory complaints: Numbness and tingling in lateral side of left leg to the foot   Gait and balance issues: Denies   Bowel or bladder issues: Denies   Duration of SX is: 8/2017 leg pain started   The symptoms are worse with: Sitting   The symptoms are better with: Standing and ibuprofen   Injury: Denies   Severity is: chronic   Patient has tried the following conservative measures: Pt has done PT and helped mobility but did not help pain. She also did a injection and did not help.  WU score is: 32%  SETH Roberts

## 2021-06-15 NOTE — PROGRESS NOTES
Balta Garrido is status post Left L5S1 hemilaminectomy microdiskectomy on 12/19/2017 by Dr. Tong.  Preoperatively presented with Left LE excruciating radicular pain, worse with positional irritation.  Today she returns in follow up for staples removal. She is very pleased with her outcome - her left leg pain is gone. She denies any weakness in LE. Reports some residual numbness in her left foot. Gait and balance are normal. Not taking pain meds.      Surgical wound WNL - CDI, no signs of infection or skin breakdown.  Incision well-healed: good skin approximation, no redness or visible/palpable edema, no tenderness to palpation.  PT. AF, denies fever, chills or sweats.  Pt. reports that the symptoms are improved from pre-op.    Staples - intact removed without difficulty. Wound prepped with Betadine before and after removal.  Surrounding skin has no signs of breakdown.  Verbal instructions regarding incision care are given.  Pt. advised to call us if any s/s of infection noted - all discussed in details.

## 2021-06-15 NOTE — PROGRESS NOTES
CHART NOTE     1985     DATE OF SERVICE: 2/1/2018     FOLLOW UP EVALUATION:      ASSESSMENT AND PLAN:Balta Garrido is status post LEFT L5-S1 HEMILAMINECTOMY MICRODISCECTOMY FORAMINOTOMY by Dr. Tong on 12/19/2017  Symptoms resolved, no new or worsening deficits. Referral to PT for Medex or like program   Due to non sacralized S1-2, continue weight restrictions x 12 weeks of no lifting > 5 lbs.    Follow up as needed HPI:  Patient initially presented with left leg pain     Today, Balta Garrido reports leg pain resolved--doing well      PAST MEDICAL HISTORY, SURGICAL HISTORY, REVIEW OF SYMPTOMS, MEDICATIONS AND ALLERGIES:  Past medical history, surgical history, review of symptoms, medications and allergies remain unchanged.    Vitals:    02/01/18 0906   BP: 99/66   Pulse: 68   Resp: 16   Temp: 97.8  F (36.6  C)   SpO2: 99%        PHYSICAL EXAM:  Neurological exam reveals:  Alert and oriented x3, speech fluent and appropriate.   PERRL, EOMI, face symmetric, tongue midline, shoulder shrug equal  No pronator drift, finger to nose smooth and accurate bilaterally  Arm strength bilateral grasp, biceps, triceps, and deltoids 5/5   Leg strength bilateral dorsiflexion, plantar flexion, and hip flexion 5/5  Muscle Bulk and tone wnl.   Reflexes:No pathological reflexes   Gait and station:Normal  Incision:healing well

## 2021-06-16 NOTE — PROGRESS NOTES
[facility] Daily Progress     Patient Name: Balta Garrido  Date: 2018  Visit #: 4  PTA visit #:    Referral Diagnosis: lumbar disc herniation, LBP  Referring provider: Christine Rivers CNP  Visit Diagnosis:     ICD-10-CM    1. Lumbar radiculopathy M54.16    2. Generalized muscle weakness M62.81    3. Acute left-sided low back pain with left-sided sciatica M54.42      Assessment:     Overall, patient has improved since having her surgery in left left glut and leg. She is feeling like her strength is returning. She is tolerating MedX well thus far. She continues to benefit from further PT to progress her back to activities like running(not until at least 3 months post op per surgeon), yoga, and  activities.     Goal Status:  Pt will: be independent with HEP within 3 weeks.  Pt will: be able to bend and lift WFL and without restriction for work within 6 weeks.  Pt will: decrease WU by 30% or more to demonstrate improved function within 12 weeks.  Pt will: be able to return to running and yoga within 6 weeks.    Plan / Patient Education:     Continue with initial plan of care.  Progress with home program as tolerated.     Next session: continue MedX DE. and rotary torso, review UUDD and clamshells (did add band for home) , progress core (quadruped, etc). Increase resistance by 7-10# again on medX.    Subjective:     Has been doing ok. She was sore yesterday in her leg, had to use ibuprofen 2 times, but states she hasn't had to do that in a while.   Pain is not bad today.     Objective:     Lumbar MedX Initial testin18 4-week Re-test   AROM (full=  0-72  lumbar) 0-57 deg    Max Extension Torque  141#    Average Extension Torque 94#    Flex: ext ratio (ideal 1.4:1) 3.20:1      Still high reps with 10# increase in resistance on medX today.     No pain into L LE at end of session    Non tender to left calf, mild tender to left glut musculature.    Exercises:  Exercise #1: Previous  HEP: to continue slump sliders, supine sciatic nerve glides, piriformis stretch, LTRs  Comment #1: Rotary torso: started to L, 28#, 90 seconds  Exercise #2: Lower trunk rotation pull-overs: x3 B with 10 sec holds  Comment #2: TA March: x10 B, HEP  Exercise #3: Bridge: x10, HEP  Comment #3: UUDD: x10  Exercise #4: clamshells: x20 B    Treatment Today   2/27/2018   TREATMENT MINUTES COMMENTS   Evaluation     Self-care/ Home management     Manual therapy 10 In supine:  - passive L nerve glides, hip flexion, 90-90 position  In prone:   - manual LE axial distraction   Neuromuscular Re-education     Therapeutic Activity     Therapeutic Exercises 10 See MedX and Exercise flowsheets. Just verbal review of UUDD and planks   Gait training     Modality__________________                Total 20 Late start to session d/t patient late and changing   Blank areas are intentional and mean the treatment did not include these items.       Chiara Mike, PT, DPT  2/27/2018

## 2021-06-16 NOTE — PROGRESS NOTES
[facility] Daily Progress     Patient Name: Balta Garrido  Date: 3/8/2018  Visit #: 7  PTA visit #:    Referral Diagnosis: lumbar disc herniation, LBP  Referring provider: Christine Rivers CNP  Visit Diagnosis:     ICD-10-CM    1. Lumbar radiculopathy M54.16    2. Generalized muscle weakness M62.81    3. Acute left-sided low back pain with left-sided sciatica M54.42      Assessment:     Pt continues to progress well with PT. She shows improvement with strength and ROM today with MedX re-testing. She continues to progress with HEP and tolerating pilates reformer thus far. She has had minimal pain over the past 2 weeks and feels her improvement. She continues to be motivated with her exercises at home. Pt to continue with PT as she progresses.    Goal Status:  Pt will: be independent with HEP within 3 weeks.  Pt will: be able to bend and lift WFL and without restriction for work within 6 weeks.  Pt will: decrease WU by 30% or more to demonstrate improved function within 12 weeks.  Pt will: be able to return to running and yoga within 6 weeks.    Plan / Patient Education:     Next session: continue MedX DE. and rotary torso, Pilates reformer.    Subjective:     Pain rating: no pain  Pt shares that she has been doing well. She did not have any pain in the shower this morning!     Objective:     Lumbar MedX Initial testin18 4-week Re-test: 3/8/18   AROM (full=  0-72  lumbar) 0-57 deg 0-69 deg   Max Extension Torque  141# 228#   Average Extension Torque 94# 194#   Flex: ext ratio (ideal 1.4:1) 3.20:1 1.39:1     Continued pilates reformer today, tolerating well    Improving strength and ROM with MedX re-test today    Exercises:  Exercise #1: Previous HEP: to continue slump sliders, supine sciatic nerve glides, piriformis stretch, LTRs  Comment #1: Rotary torso: started to L, 30#, 90 seconds  Exercise #2: Lower trunk rotation pull-overs: x3 B with 10 sec holds  Comment #2: TA March: x10 B,  HEP  Exercise #3: Bridge: reviewed, single leg (kick outs) x5 B, HEP  Comment #3: UUDD: x10  Exercise #4: clamshells: x20 B  Comment #4: quadruped alt leg/arm: x6 B  Exercise #5: Reviewed planks, with PPT  Comment #5: Reformer: supine leg press double and single leg all springs 10x ea  Exercise #6: Reformer: supine bridges, not letting carriage move, 2R1B 15x  Comment #6: reformer: prancing 2R1B 20x  Exercise #7: reformer: standing hip abduction 1R1B 10x bilat    Treatment Today   3/8/2018   TREATMENT MINUTES COMMENTS   Evaluation     Self-care/ Home management     Manual therapy     Neuromuscular Re-education     Therapeutic Activity     Therapeutic Exercises 25 See MedX and Exercise flowsheets.    Gait training     Modality__________________                Total 25    Blank areas are intentional and mean the treatment did not include these items.       Aric Moore, PT, DPT  3/8/2018

## 2021-06-16 NOTE — PROGRESS NOTES
[facility]   [facility]-Pel[facility] Initial Evaluation    Patient Name: Balta Garrido  Date of evaluation: 2/12/2018  Referral Diagnosis: Lumbar disc herniation with radiculopathy  Referring provider: Christine Rivers CNP  Visit Diagnosis:     ICD-10-CM    1. Lumbar radiculopathy M54.16    2. Generalized muscle weakness M62.81        Assessment:     Balta Garrido is a 32 y.o. female who presents to therapy today with chief complaints of L LE radicular pain that has been improving since left L5-S1 hemilaminectomy, microdiscectomy, foraminotomy on 12/19/17. Pt feels up to 80% improvements since the surgery. Pain is most notable in the L LE with prolonged standing or walking and at the end of the work week. Pt continues to be on a 5# lifting restriction for 5 more weeks. Pt denies any numbness or tingling. Pt demonstrates low back weakness with MedX testing today compared to norms and would benefit from further core and back strengthening with MedX program. Pt reported h/o asthma and PTSD.  Pain symptoms are improving.  Functional impairments include prolonged standing and walking, work, bending, lifting, and ADLs.     status post LEFT L5-S1 HEMILAMINECTOMY MICRODISCECTOMY FORAMINOTOMY by Dr. Tong on 12/19/2017    Impairments in  pain, posture, ROM, strength, ADL's  The POC is dynamic and will be modified on an ongoing basis.  Patient will return to clinic if symptoms persist.  Barriers to achieving goals as noted in the assessment section may affect outcome.  Prognosis to achieve goals is  good   Skilled PT is required to improve mobility, strength, core stability, ROM, flexibility, and reduce pain.  Pt. is appropriate for skilled PT intervention as outlined in the Plan of Care (POC).  Pt. is a good candidate for skilled PT services to improve pain levels and function.    Goals:  No Data Recorded  Pt will: be independent with HEP within 3 weeks.  Pt will: be able to bend and lift WFL and without  restriction for work within 6 weeks.  Pt will: decrease WU by 30% or more to demonstrate improved function within 12 weeks.  Pt will: be able to return to running and yoga within 6 weeks.    Patient's expectations/goals are realistic.    Barriers to Learning or Achieving Goals:  No Barriers.       Plan / Patient Instructions:        Plan of Care:   Authorization / Certification Start Date: 02/12/18  Authorization / Certification End Date: 05/12/18  Authorization / Certification Number of Visits: 14-16  Communication with: Referral Source  Patient Related Instruction: Nature of Condition;Treatment plan and rationale;Self Care instruction;Basis of treatment;Body mechanics;Posture;Precautions;Next steps;Expected outcome  Times per Week: 1-2  Number of Weeks: 8-12  Number of Visits: 14-16  Discharge Planning: Pt will be discharged when all goals are met, lack of progress or worsening condition, MD referral, and/or pt desires to continue with HEP independently.  Therapeutic Exercise: ROM;Stretching;Strengthening  Neuromuscular Reeducation: kinesio tape;posture;TNE;core  Manual Therapy: soft tissue mobilization;myofascial release;joint mobilization;muscle energy  Modalities: electrical stimulation;TENS;iontophoresis;ultrasound;cold pack;hot pack  Functional Training (ADL's): self care;meal prep;ADL's;instructions for equipment;instructions in transfers;compensatory training;ergonomics  Equipment: theraband;TENS unit     POC discussed with patient and patient is in agreement with this plan    Plan for next visit: MedX DE, rotary torso, review HEP, initiate light core, STM as needed     Subjective:    Social information:   Occupation: NP   Work Status:Working full time   Equipment Available: None    History of Present Illness:    Balta is a 32 y.o. female who presents to therapy today with complaints of L radicular pain that has been ongoing since May of 2017. She recently had LEFT L5-S1 HEMILAMINECTOMY MICRODISCECTOMY  FORAMINOTOMY by Dr. Tong on 2017. Pt feels that her pain is 75-80% better since the surgery. Pt says that she is only taking medication once a day since the pain is better. She is no longer having any numbness and tingling as well. She has been able to return to work, but on no lifting above 5# for 12 weeks.  She denies history of similar symptoms. She describes their previous level of function as not limited.    Pain Ratin  Pain rating at best: 0  Pain rating at worst: 9  Pain description: sharp and shooting    Functional limitations are described as occurring with:   dependent care  performing routine daily activities  standing : 2-3 hour tolerance  walking : 4 hour tolerance    Patient reports benefit from:  rest  , heat, cold         Objective:      Note: Items left blank indicates the item was not performed or not indicated at the time of the evaluation.    Patient Outcome Measures :    Modified Oswestry Low Back Pain Disablity Questionnaire  in %: 26   Scores range from 0-100%, where a score of 0% represents minimal pain and maximal function. The minimal clinically important difference is a score reduction of 12%.    Examination  1. Lumbar radiculopathy     2. Generalized muscle weakness       Involved side: Left  Posture Observation:      General sitting posture is  normal.  General standing posture is normal.    Lumbar ROM:    Date: 18     *Indicate scale AROM AROM AROM   Lumbar Flexion Just shy of toes     Lumbar Extension Min restriction      Right Left Right Left Right Left   Lumbar Sidebending Min restriction Min restriction       Lumbar Rotation WFL WFL       Thoracic Flexion      Thoracic Extension      Thoracic Sidebending         Thoracic Rotation           Lower Extremity Strength:     Date: 18     LE strength/5 Right Left Right Left Right Left   Hip Flexion (L1-3) 5 5       Hip Extension (L5-S1)         Hip Abduction (L4-5) 5 5       Hip Adduction (L2-3) 5 5       Hip  External Rotation         Hip Internal Rotation         Knee Extension (L3-4) 5 5       Knee Flexion 5 5       Ankle Dorsiflexion (L4-5) 5 5       Great Toe Extension (L5)         Ankle Plantar flexion (S1)         Abdominals        Sensation: not impaired    Palpation: mild lumbar paraspinal tenderness    Lumbar Special Tests:     Lumbar Special Tests Right Left SI Tests Right  Left   Quadrant test   SI Compression     Straight leg raise   SI Distraction     Crossover response   POSH Test     Slump  + Sacral Thrust     Sit-up test  FADIR     Trunk extensor endurance test  Resisted Abduction     Prone instability test  Other:     Pubic shotgun  Other:       Repeated Motion Testing:  Not indicated    Passive Mobility - Joint Integrity:  WNL    LE Screen:  mild L hamstring tightness    Treatment Today     TREATMENT MINUTES COMMENTS   Evaluation 25    Self-care/ Home management     Manual therapy     Neuromuscular Re-education     Therapeutic Activity     Therapeutic Exercises 25 See exercise and MedX flowsheets. Educated on POC and MedX program. Discussed importance of HEP.   Gait training     Modality__________________                Total 50    Blank areas are intentional and mean the treatment did not include these items.     PT Evaluation Code: (Please list factors)  Patient History/Comorbidities: PTSD, asthma  Examination: lumbar radiculopathy  Clinical Presentation: stable  Clinical Decision Making: low    Patient History/  Comorbidities Examination  (body structures and functions, activity limitations, and/or participation restrictions) Clinical Presentation Clinical Decision Making (Complexity)   No documented Comorbidities or personal factors 1-2 Elements Stable and/or uncomplicated Low   1-2 documented comorbidities or personal factor 3 Elements Evolving clinical presentation with changing characteristics Moderate   3-4 documented comorbidities or personal factors 4 or more Unstable and unpredictable High           Aric Moore, PT, DPT  2/12/2018  8:32 AM

## 2021-06-16 NOTE — PROGRESS NOTES
"[facility] Daily Progress     Patient Name: Balta Garrido  Date: 3/12/2018  Visit #: 8  PTA visit #:    Referral Diagnosis: lumbar disc herniation, LBP  Referring provider: Christine Rivers CNP  Visit Diagnosis:     ICD-10-CM    1. Lumbar radiculopathy M54.16    2. Generalized muscle weakness M62.81    3. Acute left-sided low back pain with left-sided sciatica M54.42      Assessment:     Pt continues to tolerate MedX well. She has progressed with pilates reformer exercises over the past few sessions and having more of a challenge with these exercises. Pt performed side planks today which were more challenging.     Goal Status:  Pt will: be independent with HEP within 3 weeks.  Pt will: be able to bend and lift WFL and without restriction for work within 6 weeks.  Pt will: decrease WU by 30% or more to demonstrate improved function within 12 weeks.  Pt will: be able to return to running and yoga within 6 weeks.    Plan / Patient Education:     Next session: continue MedX DE. and rotary torso, Pilates reformer.    Subjective:     Pain rating: no pain  Pt says that she noticed from soreness yesterday when painting in her house, but \"not bad.\"     Objective:     Lumbar MedX Initial testin18 4-week Re-test: 3/8/18   AROM (full=  0-72  lumbar) 0-57 deg 0-69 deg   Max Extension Torque  141# 228#   Average Extension Torque 94# 194#   Flex: ext ratio (ideal 1.4:1) 3.20:1 1.39:1     Increased difficulty with side planks, added to HEP    No increases pain during session    Exercises:  Exercise #1: Previous HEP: to continue slump sliders, supine sciatic nerve glides, piriformis stretch, LTRs  Comment #1: Rotary torso: started to L, 34#, 90 seconds  Exercise #2: Lower trunk rotation pull-overs: x3 B with 10 sec holds  Comment #2: TA March: x10 B, HEP  Exercise #3: Bridge: reviewed, single leg (kick outs) x5 B, HEP  Comment #3: UUDD: x10  Exercise #4: clamshells: x20 B  Comment #4: quadruped alt " leg/arm: x6 B  Exercise #5: Side planks: x3 B with 20 sec holds  Comment #5: Reformer: supine leg press double and single leg all springs 10x ea  Exercise #6: Reformer: supine bridges, not letting carriage move, 2R1B 15x  Comment #6: \  Exercise #7: reformer: standing hip abduction 1R1B 12x bilat  Comment #7: reformer: overhead pull-downs 2R x15  Exercise #8: reformer: seated rotations 2R x15 B    Treatment Today   3/12/2018   TREATMENT MINUTES COMMENTS   Evaluation     Self-care/ Home management     Manual therapy     Neuromuscular Re-education     Therapeutic Activity     Therapeutic Exercises 25 See MedX and Exercise flowsheets.    Gait training     Modality__________________                Total 25    Blank areas are intentional and mean the treatment did not include these items.       Aric Moore, PT, DPT  3/12/2018

## 2021-06-16 NOTE — PROGRESS NOTES
[facility] Daily Progress     Patient Name: Balta Garrido  Date: 3/6/2018  Visit #: 6  PTA visit #:    Referral Diagnosis: lumbar disc herniation, LBP  Referring provider: Christine Rivers CNP  Visit Diagnosis:     ICD-10-CM    1. Lumbar radiculopathy M54.16    2. Generalized muscle weakness M62.81    3. Acute left-sided low back pain with left-sided sciatica M54.42      Assessment:     Pt continues to progress well with PT. She has been having less overall pain into the L LE and back. She continues to benefit from further PT to increase core strength. Great Compliance overall. Remains motivated to return to core power yoga and running (at 3 months post op).     Goal Status:  Pt will: be independent with HEP within 3 weeks.  Pt will: be able to bend and lift WFL and without restriction for work within 6 weeks.  Pt will: decrease WU by 30% or more to demonstrate improved function within 12 weeks.  Pt will: be able to return to running and yoga within 6 weeks.    Plan / Patient Education:     Next session: continue MedX DE. and rotary torso, Pilates reformer.    Subjective:     Pain rating: no pain  Pt shares that she has been doing well. She said that she had earlier in the week after playing with her child, but has not had to take any pain meds. She also notices more discomfort when in the shower in the am.    Objective:     Lumbar MedX Initial testin18 4-week Re-test   AROM (full=  0-72  lumbar) 0-57 deg    Max Extension Torque  141#    Average Extension Torque 94#    Flex: ext ratio (ideal 1.4:1) 3.20:1      Added reformer exercises today, tolerated well!    Exercises:  Exercise #1: Previous HEP: to continue slump sliders, supine sciatic nerve glides, piriformis stretch, LTRs  Comment #1: Rotary torso: started to L, 30#, 90 seconds  Exercise #2: Lower trunk rotation pull-overs: x3 B with 10 sec holds  Comment #2: TA March: x10 B, HEP  Exercise #3: Bridge: reviewed, single leg (kick outs)  x5 B, HEP  Comment #3: UUDD: x10  Exercise #4: clamshells: x20 B  Comment #4: quadruped alt leg/arm: x6 B  Exercise #5: Reviewed planks, with PPT  Comment #5: Reformer: supine leg press double and single leg all springs 10x ea  Exercise #6: Reformer: supine bridges, not letting carriage move, 2R1B 15x  Comment #6: reformer: prancing 2R1B 20x  Exercise #7: reformer: standing hip abduction 1R1B 10x bilat    Treatment Today   3/6/2018   TREATMENT MINUTES COMMENTS   Evaluation     Self-care/ Home management     Manual therapy 5 Assessed left gastroc, gentle STM, improved myofascial mobility, less knots and feels better overall.    Neuromuscular Re-education     Therapeutic Activity     Therapeutic Exercises 25 See MedX and Exercise flowsheets.    Gait training     Modality__________________                Total 30    Blank areas are intentional and mean the treatment did not include these items.       Chiara Mike, PT, DPT  3/6/2018

## 2021-06-16 NOTE — PROGRESS NOTES
[facility] Daily Progress     Patient Name: Balta Garrido  Date: 3/22/2018  Visit #: 10  PTA visit #:    Referral Diagnosis: lumbar disc herniation, LBP  Referring provider: Christine Rivers CNP  Visit Diagnosis:     ICD-10-CM    1. Lumbar radiculopathy M54.16    2. Generalized muscle weakness M62.81      Assessment:     Pt having no pain today. She continues to tolerate increases in resistance with MedX DE and rotary torso. She has been able to progress with planks and tolerating for longer durations. Based on progress, pt may be able to discharge from PT within a few sessions. Continues to be very compliant with HEP.     Goal Status:  Pt will: be independent with HEP within 3 weeks.  Pt will: be able to bend and lift WFL and without restriction for work within 6 weeks.  Pt will: decrease WU by 30% or more to demonstrate improved function within 12 weeks.  Pt will: be able to return to running and yoga within 6 weeks.    Plan / Patient Education:     Next session: continue MedX DE and rotary torso, Pilates reformer, band exercises for multifidus, trial supermans, discuss possible discharge if appropriate    Subjective:     Pain rating: no pain  Pt doing well! She is not having any pain today. She did notice some tightness in her middle back yesterday.    Objective:     Lumbar MedX Initial testin18 4-week Re-test: 3/8/18   AROM (full=  0-72  lumbar) 0-57 deg 0-69 deg   Max Extension Torque  141# 228#   Average Extension Torque 94# 194#   Flex: ext ratio (ideal 1.4:1) 3.20:1 1.39:1     No limitations or tightness with reach/roll or thread the needle    Educated on use of tennis ball at home for back tightness    Exercises:  Exercise #1: Previous HEP: to continue slump sliders, supine sciatic nerve glides, piriformis stretch, LTRs  Comment #1: Rotary torso: started to L, 38#, 90 seconds  Exercise #2: Lower trunk rotation pull-overs: x3 B with 10 sec holds  Comment #2: TA March: x10 B,  "HEP  Exercise #3: Bridge: with knee extension x10  Comment #3: UUDD: x10  Exercise #4: clamshells: x20 B  Comment #4: quadruped alt leg/arm: x6 B  Exercise #5: Side planks: x3 B with 20 sec holds  Comment #5: Reformer: supine leg press double and single leg all springs 10x ea  Exercise #6: Reformer: supine bridges, not letting carriage move, 2R1B 15x  Comment #6: \  Exercise #7: reformer: standing hip abduction 1R1B 12x bilat  Comment #7: reformer: overhead pull-downs 2R1B x15  Exercise #8: reformer: seated rotations 2R x15 B  Comment #8: reformer: kneeling pushaways 1R  Exercise #9: reach/roll\" x8 B    Treatment Today   3/22/2018   TREATMENT MINUTES COMMENTS   Evaluation     Self-care/ Home management     Manual therapy     Neuromuscular Re-education     Therapeutic Activity     Therapeutic Exercises 28 See MedX and Exercise flowsheets.    Gait training     Modality__________________                Total 28    Blank areas are intentional and mean the treatment did not include these items.       Aric Moore, PT, DPT  3/22/2018  "

## 2021-06-16 NOTE — PROGRESS NOTES
[facility] Daily Progress     Patient Name: Balta Garrido  Date: 3/1/2018  Visit #: 5  PTA visit #:    Referral Diagnosis: lumbar disc herniation, LBP  Referring provider: Christine Rivers CNP  Visit Diagnosis:     ICD-10-CM    1. Lumbar radiculopathy M54.16    2. Generalized muscle weakness M62.81    3. Acute left-sided low back pain with left-sided sciatica M54.42      Assessment:     Pt continues to progress well with PT. She has been having less overall pain into the L LE and back. She has increased difficulty with bridge today with knee extensions showing glute and core weakness. Given for HEP, but will require a review for technique. Pt was able to perform more appropriate repetitions with MedX DE today with more increases in resistance. Pt continues to be compliant with HEP.     Goal Status:  Pt will: be independent with HEP within 3 weeks.  Pt will: be able to bend and lift WFL and without restriction for work within 6 weeks.  Pt will: decrease WU by 30% or more to demonstrate improved function within 12 weeks.  Pt will: be able to return to running and yoga within 6 weeks.    Plan / Patient Education:     Continue with initial plan of care.  Progress with home program as tolerated.     Next session: continue MedX DE. and rotary torso, review bridge with knee extensions (cues for pelvic alignment), progress core (review quadruped, etc). Pilates reformer? Increase resistance by 5-10# again on medX.    Subjective:     Pain rating: not reported  Pt shares that she has been doing well. She said that she had earlier in the week after playing with her child, but has not had to take any pain meds. She also notices more discomfort when in the shower.     Objective:     Lumbar MedX Initial testin18 4-week Re-test   AROM (full=  0-72  lumbar) 0-57 deg    Max Extension Torque  141#    Average Extension Torque 94#    Flex: ext ratio (ideal 1.4:1) 3.20:1      More appropriate repetitions with  MedX DE, may continue to increase 5-10#    Reviewed technique of planks, cues to maintain PPT    Mild drop in pelvis with bridge (with leg extensions), improved with cues    Exercises:  Exercise #1: Previous HEP: to continue slump sliders, supine sciatic nerve glides, piriformis stretch, LTRs  Comment #1: Rotary torso: started to L, 28#, 90 seconds  Exercise #2: Lower trunk rotation pull-overs: x3 B with 10 sec holds  Comment #2: TA March: x10 B, HEP  Exercise #3: Bridge: x10, HEP  Comment #3: UUDD: x10  Exercise #4: clamshells: x20 B    Treatment Today   3/1/2018   TREATMENT MINUTES COMMENTS   Evaluation     Self-care/ Home management     Manual therapy     Neuromuscular Re-education     Therapeutic Activity     Therapeutic Exercises 28 See MedX and Exercise flowsheets.    Gait training     Modality__________________                Total 28    Blank areas are intentional and mean the treatment did not include these items.       Aric Moore, PT, DPT  3/1/2018

## 2021-06-16 NOTE — PROGRESS NOTES
[facility] Daily Progress     Patient Name: Balta Garrido  Date: 2/15/2018  Visit #: 2  PTA visit #:    Referral Diagnosis: lumbar disc herniation, LBP  Referring provider: Christine Rivers CNP  Visit Diagnosis:     ICD-10-CM    1. Lumbar radiculopathy M54.16    2. Generalized muscle weakness M62.81          Assessment:     Patient is benefitting from skilled physical therapy and is making steady progress toward functional goals.  Patient is appropriate to continue with skilled physical therapy intervention, as indicated by initial plan of care.     Pt tolerates MedX DE well today, but may be appropriate for further increases in resistance based on repetitions. Pt has been able to perform light mobility exercises within HEP and core strengthening added today. Pt may require a review of maintaining TA with exercises during upcoming sessions. No pain during session.     Goal Status:  Pt will: be independent with HEP within 3 weeks.  Pt will: be able to bend and lift WFL and without restriction for work within 6 weeks.  Pt will: decrease WU by 30% or more to demonstrate improved function within 12 weeks.  Pt will: be able to return to running and yoga within 6 weeks.    Plan / Patient Education:     Continue with initial plan of care.  Progress with home program as tolerated.     Next session: continue MedX DE and rotary torso, review bridge, advance TA March, progress core (quadruped, etc)    Subjective:     Pain Ratin  Pt shares that she did have pain down her leg for about 15 minutes after last session. She was not concerned about this. She is feeling very well today.       Objective:     Lumbar MedX Initial testin18 4-week Re-test   AROM (full=  0-72  lumbar) 0-57 deg    Max Extension Torque  141#    Average Extension Torque 94#    Flex: ext ratio (ideal 1.4:1) 3.20:1      High repetitions with MedX DE    No pain increases during session    Proper TA control with  and  bridges    Exercises:  Exercise #1: Previous HEP: to continue slump sliders, supine sciatic nerve glides, piriformis stretch, LTRs  Comment #1: Rotary torso: started to R, 24#, 90 seconds  Exercise #2: Lower trunk rotation pull-overs: x3 B with 10 sec holds  Comment #2: TA March: x10 B, HEP  Exercise #3: Bridge: x10, HEP    Treatment Today     TREATMENT MINUTES COMMENTS   Evaluation     Self-care/ Home management     Manual therapy     Neuromuscular Re-education     Therapeutic Activity     Therapeutic Exercises 29 See MedX and Exercise flowsheets. Added bridge and TA March to HEP.   Gait training     Modality__________________                Total 29    Blank areas are intentional and mean the treatment did not include these items.       Aric Moore, PT, DPT  2/15/2018

## 2021-06-16 NOTE — PROGRESS NOTES
[facility] Daily Progress     Patient Name: Balta Garrido  Date: 3/15/2018  Visit #: 9  PTA visit #:    Referral Diagnosis: lumbar disc herniation, LBP  Referring provider: Christine Rivers CNP  Visit Diagnosis:     ICD-10-CM    1. Lumbar radiculopathy M54.16    2. Generalized muscle weakness M62.81      Assessment:     Pt continues to progress well. She has progressed with pilates reformer exercises and to ball exercises today. She would benefit from further exercise with therapy balls for further core stability and back strengthening. Pt is encouraged to slowly return to yoga in the coming weeks. She continues to have minimal to no pain during sessions.    Goal Status:  Pt will: be independent with HEP within 3 weeks.  Pt will: be able to bend and lift WFL and without restriction for work within 6 weeks.  Pt will: decrease WU by 30% or more to demonstrate improved function within 12 weeks.  Pt will: be able to return to running and yoga within 6 weeks.    Plan / Patient Education:     Next session: continue MedX DE. and rotary torso, Pilates reformer, band exercises for multifidus, trial supermans    Subjective:     Pain rating: no pain  Pt shares that she has been feeling good! She had a little soreness this morning, but got better once up moving. She is hoping to get back to yoga and running soon.    Objective:     Lumbar MedX Initial testin18 4-week Re-test: 3/8/18   AROM (full=  0-72  lumbar) 0-57 deg 0-69 deg   Max Extension Torque  141# 228#   Average Extension Torque 94# 194#   Flex: ext ratio (ideal 1.4:1) 3.20:1 1.39:1     Continues with pilates reformer    Began ball exercises, increased difficulty due to less stability    No increases pain during session    Exercises:  Exercise #1: Previous HEP: to continue slump sliders, supine sciatic nerve glides, piriformis stretch, LTRs  Comment #1: Rotary torso: started to R, 36#, 90 seconds  Exercise #2: Lower trunk rotation pull-overs:  x3 B with 10 sec holds  Comment #2: TA March: x10 B, HEP  Exercise #3: Bridge:on ball x10  Comment #3: UUDD: x10  Exercise #4: clamshells: x20 B  Comment #4: quadruped alt leg/arm: x6 B  Exercise #5: Side planks: x3 B with 20 sec holds  Comment #5: Reformer: supine leg press double and single leg all springs 10x ea  Exercise #6: Reformer: supine bridges, not letting carriage move, 2R1B 15x  Comment #6: \  Exercise #7: reformer: standing hip abduction 1R1B 12x bilat  Comment #7: reformer: overhead pull-downs 2R1B x15  Exercise #8: reformer: seated rotations 2R x15 B  Comment #8: reformer: kneeling pushaways 1R    Treatment Today   3/15/2018   TREATMENT MINUTES COMMENTS   Evaluation     Self-care/ Home management     Manual therapy     Neuromuscular Re-education     Therapeutic Activity     Therapeutic Exercises 27 See MedX and Exercise flowsheets.    Gait training     Modality__________________                Total 27    Blank areas are intentional and mean the treatment did not include these items.       Aric Moore, PT, DPT  3/15/2018

## 2021-06-16 NOTE — PROGRESS NOTES
[facility] Daily Progress     Patient Name: Balta Garrido  Date: 2018  Visit #: 3  PTA visit #:    Referral Diagnosis: lumbar disc herniation, LBP  Referring provider: Christine Rivers CNP  Visit Diagnosis:     ICD-10-CM    1. Lumbar radiculopathy M54.16    2. Generalized muscle weakness M62.81          Assessment:     Patient is benefitting from skilled physical therapy and is making steady progress toward functional goals.  Patient is appropriate to continue with skilled physical therapy intervention, as indicated by initial plan of care.     Pt having more L radicular, LE pain today, but improving during session today. Pt has been tolerating MedX well, but continues to perform high levels of repetitions. Pt has slowly progressed with glute and core HEP progression. She may progress even more quickly including planks and pilates reformer exercises. Pt would benefit from continuing PT to improve core stabilization and decreasing her pain.     Goal Status:  Pt will: be independent with HEP within 3 weeks.  Pt will: be able to bend and lift WFL and without restriction for work within 6 weeks.  Pt will: decrease WU by 30% or more to demonstrate improved function within 12 weeks.  Pt will: be able to return to running and yoga within 6 weeks.    Plan / Patient Education:     Continue with initial plan of care.  Progress with home program as tolerated.     Next session: continue MedX DE (increase 10#) and rotary torso, review UUDD and clamshells, progress core (quadruped, etc)    Subjective:     Pain Ratin  Pt says that she is having some pain in her L leg. She did do a lot more moving around yesterday which she thinks why she is sore.     Objective:     Lumbar MedX Initial testin18 4-week Re-test   AROM (full=  0-72  lumbar) 0-57 deg    Max Extension Torque  141#    Average Extension Torque 94#    Flex: ext ratio (ideal 1.4:1) 3.20:1      Continued high repetitions with MedX  DE    Increased difficulty with UUDD, added to HEP    No pain into L LE at end of session    Exercises:  Exercise #1: Previous HEP: to continue slump sliders, supine sciatic nerve glides, piriformis stretch, LTRs  Comment #1: Rotary torso: started to R, 24#, 90 seconds  Exercise #2: Lower trunk rotation pull-overs: x3 B with 10 sec holds  Comment #2: TA March: x10 B, HEP  Exercise #3: Bridge: x10, HEP    Treatment Today     TREATMENT MINUTES COMMENTS   Evaluation     Self-care/ Home management     Manual therapy 5 In supine:  - passive L nerve glides, hip flexion, 90-90 position   Neuromuscular Re-education     Therapeutic Activity     Therapeutic Exercises 25 See MedX and Exercise flowsheets. Added UUDD and clamshells to HEP.   Gait training     Modality__________________                Total 30    Blank areas are intentional and mean the treatment did not include these items.       Aric Moore, PT, DPT  2/22/2018

## 2021-06-17 NOTE — PROGRESS NOTES
[facility] Daily Progress     Patient Name: Balta Garrido  Date: 3/29/2018  Visit #: 11  PTA visit #:    Referral Diagnosis: lumbar disc herniation, LBP  Referring provider: Christine Rivers CNP  Visit Diagnosis:     ICD-10-CM    1. Lumbar radiculopathy M54.16    2. Generalized muscle weakness M62.81      Assessment:     Pt progressing well with PT and MedX. She was able to tolerate supermans today without and pain increases, but challenging. Pt will be appropriate to discontinue PT after next session based on progress and compliance with HEP. She plans to slowly return to normal activities and workout routines soon.     Goal Status:  Pt will: be independent with HEP within 3 weeks.  Pt will: be able to bend and lift WFL and without restriction for work within 6 weeks.  Pt will: decrease WU by 30% or more to demonstrate improved function within 12 weeks.  Pt will: be able to return to running and yoga within 6 weeks.    Plan / Patient Education:     Next session: continue MedX DE and rotary torso, Pilates reformer, band exercises for multifidus, review HEP, discharge    Subjective:     Pain rating: no pain  Pt says she has been doing well. She had some low back pain yesterday, but only for a short period of time.     Objective:     Lumbar MedX Initial testin18 4-week Re-test: 3/8/18   AROM (full=  0-72  lumbar) 0-57 deg 0-69 deg   Max Extension Torque  141# 228#   Average Extension Torque 94# 194#   Flex: ext ratio (ideal 1.4:1) 3.20:1 1.39:1     Continues to tolerate exercises well  No pain with supermans    Possible re-test next session    Exercises:  Exercise #1: Previous HEP: to continue slump sliders, supine sciatic nerve glides, piriformis stretch, LTRs  Comment #1: Rotary torso: started to R, 40#, 90 seconds  Exercise #2: Lower trunk rotation pull-overs: x3 B with 10 sec holds  Comment #2: TA March: x10 B, HEP  Exercise #3: Bridge: with knee extension x10  Comment #3: UUDD:  "x10  Exercise #4: clamshells: x20 B  Comment #4: quadruped alt leg/arm: x6 B  Exercise #5: Side planks: x3 B with 20 sec holds  Comment #5: Reformer: supine leg press double and single leg all springs 10x ea  Exercise #6: Reformer: supine bridges, not letting carriage move, 2R1B 15x  Comment #6: \  Exercise #7: reformer: standing hip abduction 1R1B 12x bilat  Comment #7: reformer: overhead pull-downs 2R1B x15  Exercise #8: reformer: kneeling rotations 2R x15 B  Comment #8: reformer: kneeling pushaways 1R  Exercise #9: reach/roll\" x8 B  Comment #9: supermans: 2 pillows, x12 3 sec holds    Treatment Today   3/29/2018   TREATMENT MINUTES COMMENTS   Evaluation     Self-care/ Home management     Manual therapy     Neuromuscular Re-education     Therapeutic Activity     Therapeutic Exercises 28 See MedX and Exercise flowsheets.    Gait training     Modality__________________                Total 28    Blank areas are intentional and mean the treatment did not include these items.       Aric Moore, PT, DPT  3/29/2018  "

## 2021-06-17 NOTE — PROGRESS NOTES
[facility] Daily Progress     Patient Name: Balta Garrido  Date: 2018  Visit #: 12  PTA visit #:    Referral Diagnosis: lumbar disc herniation, LBP  Referring provider: Christine Rivers CNP  Visit Diagnosis:     ICD-10-CM    1. Lumbar radiculopathy M54.16    2. Generalized muscle weakness M62.81      Assessment:     Pt has progressed very well with PT and having minimal to no pain. She has demonstrated improved strength and ROM with MedX and HEP. HEP has been steadily progressed and pt plans to continue independently at this time. Improvements are evident with goal achievement, outcome measures (WU), and objective measures. Pt plans to slowly return to running and yoga in the coming weeks.     Goal Status:  Pt will: be independent with HEP within 3 weeks. (Goal Met)  Pt will: be able to bend and lift WFL and without restriction for work within 6 weeks. (Goal Met)  Pt will: decrease WU by 30% or more to demonstrate improved function within 12 weeks. (Goal Met)  Pt will: be able to return to running and yoga within 6 weeks. (Will try in coming weeks!)    Plan / Patient Education:     Hold chart for up to 30 days in case patient returns to PT. Pt will be discharged at that time if she does not return.     Subjective:     Pain rating: no pain  Pt shares that she is doing well. She does notice occasional flank pain when sitting in her couch. Pt feels that she has improved 85% since beginning PT.    Objective:     Lumbar MedX Initial testin18 4-week Re-test: 3/8/18   AROM (full=  0-72  lumbar) 0-57 deg 0-69 deg   Max Extension Torque  141# 228#   Average Extension Torque 94# 194#   Flex: ext ratio (ideal 1.4:1) 3.20:1 1.39:1     Lumbar ROM:             Date: 18     *Indicate scale AROM AROM AROM   Lumbar Flexion Just shy of toes  WFL     Lumbar Extension Min restriction  WFL       Right Left Right Left Right Left   Lumbar Sidebending Min restriction Min restriction  WFL WFL      "  Lumbar Rotation WFL WFL  WFL WFL       Thoracic Flexion         Thoracic Extension         Thoracic Sidebending               Thoracic Rotation                 Modified Oswestry Low Back Pain Disablity Questionnaire  in %: 0    Verbally reviewed HEP    Exercises:  Exercise #1: Previous HEP: to continue slump sliders, supine sciatic nerve glides, piriformis stretch, LTRs  Comment #1: Rotary torso: started to L, 42#, 90 seconds  Exercise #2: Lower trunk rotation pull-overs: x3 B with 10 sec holds  Comment #2: TA March: x10 B, HEP  Exercise #3: Bridge: with knee extension x10  Comment #3: UUDD: x10  Exercise #4: clamshells: x20 B  Comment #4: quadruped alt leg/arm: x6 B  Exercise #5: Side planks: x3 B with 20 sec holds  Comment #5: Reformer: supine leg press double and single leg all springs 10x ea  Exercise #6: Reformer: supine bridges, not letting carriage move, 2R1B 15x  Comment #6: \  Exercise #7: reformer: standing hip abduction 1R1B 12x bilat  Comment #7: reformer: overhead pull-downs 2R1B x15  Exercise #8: reformer: kneeling rotations 2R x15 B  Comment #8: reformer: kneeling pushaways 1R x15  Exercise #9: reach/roll\" x8 B  Comment #9: supermans: 2 pillows, x12 3 sec holds    Treatment Today   4/5/2018   TREATMENT MINUTES COMMENTS   Evaluation     Self-care/ Home management     Manual therapy     Neuromuscular Re-education     Therapeutic Activity     Therapeutic Exercises 25 See MedX and Exercise flowsheets.    Gait training     Modality__________________                Total 25    Blank areas are intentional and mean the treatment did not include these items.       Aric SMITH Puls, PT, DPT  4/5/2018  "

## 2021-06-17 NOTE — PROGRESS NOTES
[facility] Discharge Summary  Patient Name: Balta Garrido  Date: 5/9/2018  Referral Diagnosis: lumbar disc herniation, LBP  Referring provider: Christine Rivers CNP  Visit Diagnosis:   1. Lumbar radiculopathy     2. Generalized muscle weakness         Goals:  Pt will: be independent with HEP within 3 weeks. (Goal Met)  Pt will: be able to bend and lift WFL and without restriction for work within 6 weeks. (Goal Met)  Pt will: decrease WU by 30% or more to demonstrate improved function within 12 weeks. (Goal Met)  Pt will: be able to return to running and yoga within 6 weeks. (Will try in coming weeks!)    Patient was seen for 12 visits from initial evaluation to 4/5/18 with 0 missed appointments.  The patient met goals and has demonstrated understanding of and independence in the home program for self-care, and progression to next steps.  She will initiate contact if questions or concerns arise.  Patient received a home program   No further therapy is required at this time.  See most recent not for more information.    Therapy will be discontinued at this time.  The patient will need a new referral to resume.    Thank you for your referral.  Aric Moore, PT, DPT  5/9/2018  3:11 PM

## 2021-06-20 NOTE — LETTER
Letter by Sharon Minaya at      Author: Sharon Minaya Service: -- Author Type: --    Filed:  Encounter Date: 4/6/2020 Status: (Other)         April 6, 2020       Balta Garrido  [address] [address]    Dear Balta Garrido:    We are pleased to provide you with secure, online access to medical information for you and your family within [facility] [employer]. Per your request, we have expanded your account to allow access to the records of the following family members:                Bharath Garrido (privilege ends on 4/2/2032.)     How Do I Log In?  1. In your Internet browser, go to [email or web address]  2. Log into [employer] using your [employer] Username and Password.  3. Click Sign In.        How Do I Access a Family Member's Account?  4. Select the account you want to access by clicking the [place] with the appropriate patient's name at the top of your screen.   5. You will see a disclaimer page letting you know that you will be viewing a family member's record. Review the disclaimer and then click Accept Proxy Access Disclaimer to proceed.  6. Once you switch to viewing a family member's record, you can navigate to [employer] pages the same way you would for yourself. You can return to your own account by clicking the [place] at the top of the screen with your name on it.    7. To customize the colors and names of the linked accounts, you can select Personalize from the Profile dropdown menu at the top of the screen, then click the Edit button to make changes.     Additional Information  If you have questions, visit [email or web address], e-mail [email or web address] or call [phone] to talk to our [employer] staff. Remember, [employer] is NOT to be used for urgent needs. For medical emergencies, dial 911.

## 2021-06-23 ENCOUNTER — ANCILLARY PROCEDURE (OUTPATIENT)
Dept: GENERAL RADIOLOGY | Facility: CLINIC | Age: 36
End: 2021-06-23
Attending: FAMILY MEDICINE
Payer: COMMERCIAL

## 2021-06-23 ENCOUNTER — OFFICE VISIT (OUTPATIENT)
Dept: ORTHOPEDICS | Facility: CLINIC | Age: 36
End: 2021-06-23
Payer: COMMERCIAL

## 2021-06-23 DIAGNOSIS — S92.301D CLOSED FRACTURE OF BASE OF METATARSAL BONE OF RIGHT FOOT WITH ROUTINE HEALING, SUBSEQUENT ENCOUNTER: Primary | ICD-10-CM

## 2021-06-23 DIAGNOSIS — M79.671 RIGHT FOOT PAIN: ICD-10-CM

## 2021-06-23 DIAGNOSIS — S92.301D CLOSED FRACTURE OF BASE OF METATARSAL BONE OF RIGHT FOOT WITH ROUTINE HEALING, SUBSEQUENT ENCOUNTER: ICD-10-CM

## 2021-06-23 PROCEDURE — 99213 OFFICE O/P EST LOW 20 MIN: CPT | Performed by: FAMILY MEDICINE

## 2021-06-23 PROCEDURE — 73630 X-RAY EXAM OF FOOT: CPT | Mod: RT | Performed by: RADIOLOGY

## 2021-06-23 NOTE — PROGRESS NOTES
Subjective:   Balta Garrido is a 35 year old female who follows up for right foot fracture. Pt reports out of the CAM boot at home.  Feels a little lump at area of 5th metatarsal fracture area.    Date of injury: 5/7/21  Date last seen: 6/4/2021  Following Therapeutic Plan: Yes, cam boot  Pain: Unchanged  Function: Unchanged  Interval History:  Some achiness lateral right foot    PAST MEDICAL, SOCIAL, SURGICAL AND FAMILY HISTORY: She  has no past medical history on file.  She  has no past surgical history on file.  Her family history is not on file.  She reports that she has never smoked. She has never used smokeless tobacco. She reports current alcohol use. She reports that she does not use drugs.    ALLERGIES: She is allergic to percocet [oxycodone-acetaminophen].    CURRENT MEDICATIONS: She has a current medication list which includes the following prescription(s): albuterol, bupropion, bupropion, calcium carb-cholecalciferol, fexofenadine, fluticasone-salmeterol, gianvi, hydrocodone-acetaminophen, and montelukast.     REVIEW OF SYSTEMS: 10 point review of systems is negative except as noted above.     Exam:   There were no vitals taken for this visit.           CONSTITUTIONAL: healthy, alert, mild distress and cooperative  HEAD: Normocephalic. No masses, lesions, tenderness or abnormalities  SKIN: no suspicious lesions or rashes  GAIT: normal and hesitant, noted balance is off  NEUROLOGIC: Non-focal, Normal muscle tone and strength, reflexes normal, sensation grossly normal.  PSYCHIATRIC: affect normal/bright and mentation appears normal.    MUSCULOSKELETAL:lateral right foot pain    ANKLE  Inspection/Palpation:     Swelling: no swelling      Non-tender: ATFL, CFL, PTFL, medial malleolus, deltoid ligament, anterior tib-fib ligament, achilles  tendon, no achilles tendon defect   Range of Motion: dorsiflexion: full, plantarflexion: full, inversion: full, eversion: full  Strength:dorsiflexion: 5/5,  plantarflexion: 5/5, inversion: 5/5, eversion: 5/5   Special tests: negative anterior drawer, negative varus stress, negative valgus stress, negative forced external rotation, negative Ng sign     FOOT  Inspection/Palpation:      Swelling: no swelling  Tender: proximal 5th MTP     Non-tender:  1st, 2nd, 3rd, 4th metatarsals, calcaneous, cuboid,  navicular, cuneiform lateral, cuneiform middle, cuneiform medial, metatarsal heads, peroneal tendon: at lateral malleolus, at cuboid, at proximal 5th metatarsal, posterior tibial tendon at medial malleolus, posterior tibial tendon at navicular, plantar fascia  Range of Motion: flexion of toes: full, extension of toes: full         Assessment/Plan:   Pt is a 36 yo white female with PMHx of anxiety presenting with follow-up    1.  Right foot fifth proximal MTP and cuboid fractures-  Working on weaning from her cam boot  Clearly has difficulties with balance and proprioception and referred the patient to physical therapy    RTC 6 weeks  X-RAY INTERPRETATION:   X-Ray of the Right Foot: 3-view, ap, lateral, oblique   ordered and interpreted by me in the office today was positive for healing fifth proximal MTP, cuboid is healed

## 2021-06-23 NOTE — LETTER
6/23/2021      RE: Balta Garrido  [address] [facility] [id number]        Subjective:   Balta Garrido is a 35 year old female who follows up for right foot fracture. Pt reports out of the CAM boot at home.  Feels a little lump at area of 5th metatarsal fracture area.    Date of injury: 5/7/21  Date last seen: 6/4/2021  Following Therapeutic Plan: Yes, cam boot  Pain: Unchanged  Function: Unchanged  Interval History:  Some achiness lateral right foot    PAST MEDICAL, SOCIAL, SURGICAL AND FAMILY HISTORY: She  has no past medical history on file.  She  has no past surgical history on file.  Her family history is not on file.  She reports that she has never smoked. She has never used smokeless tobacco. She reports current alcohol use. She reports that she does not use drugs.    ALLERGIES: She is allergic to percocet [oxycodone-acetaminophen].    CURRENT MEDICATIONS: She has a current medication list which includes the following prescription(s): albuterol, bupropion, bupropion, calcium carb-cholecalciferol, fexofenadine, fluticasone-salmeterol, gianvi, hydrocodone-acetaminophen, and montelukast.     REVIEW OF SYSTEMS: 10 point review of systems is negative except as noted above.     Exam:   There were no vitals taken for this visit.           CONSTITUTIONAL: healthy, alert, mild distress and cooperative  HEAD: Normocephalic. No masses, lesions, tenderness or abnormalities  SKIN: no suspicious lesions or rashes  GAIT: normal and hesitant, noted balance is off  NEUROLOGIC: Non-focal, Normal muscle tone and strength, reflexes normal, sensation grossly normal.  PSYCHIATRIC: affect normal/bright and mentation appears normal.    MUSCULOSKELETAL:lateral right foot pain    ANKLE  Inspection/Palpation:     Swelling: no swelling      Non-tender: ATFL, CFL, PTFL, medial malleolus, deltoid ligament, anterior tib-fib ligament, achilles  tendon, no achilles tendon defect   Range of Motion: dorsiflexion: full,  plantarflexion: full, inversion: full, eversion: full  Strength:dorsiflexion: 5/5, plantarflexion: 5/5, inversion: 5/5, eversion: 5/5   Special tests: negative anterior drawer, negative varus stress, negative valgus stress, negative forced external rotation, negative Ng sign     FOOT  Inspection/Palpation:      Swelling: no swelling  Tender: proximal 5th MTP     Non-tender:  1st, 2nd, 3rd, 4th metatarsals, calcaneous, cuboid,  navicular, cuneiform lateral, cuneiform middle, cuneiform medial, metatarsal heads, peroneal tendon: at lateral malleolus, at cuboid, at proximal 5th metatarsal, posterior tibial tendon at medial malleolus, posterior tibial tendon at navicular, plantar fascia  Range of Motion: flexion of toes: full, extension of toes: full         Assessment/Plan:   Pt is a 36 yo white female with PMHx of anxiety presenting with follow-up    1.  Right foot fifth proximal MTP and cuboid fractures-  Working on weaning from her cam boot  Clearly has difficulties with balance and proprioception and referred the patient to physical therapy    RTC 6 weeks  X-RAY INTERPRETATION:   X-Ray of the Right Foot: 3-view, ap, lateral, oblique   ordered and interpreted by me in the office today was positive for healing fifth proximal MTP, cuboid is healed      Ebonie Allen MD

## 2021-06-29 NOTE — PROGRESS NOTES
"Progress Notes by Holland Pink CNP at 2020  1:48 PM     Author: Holland Pink CNP Service: -- Author Type: Nurse Practitioner    Filed: 2020  1:49 PM Encounter Date: 2020 Status: Signed    : Holland Pink CNP (Nurse Practitioner)       Assessment:  Bharath Garrido is a 2 wk.o. old male infant born at Gestational Age: 39w0d via , Low Transverse delivery on 4/3/2020 at 10:02 AM here for lactation support due to 14% weight loss from birthweight. He was seen by his PCP today, and was recommended an urgent lactation referral.     Bharath Garrido is having difficulties with feeding. Bharath Garrido has lost 4 oz since their last visit 9 days ago.  He is down -15% from birthweight. Mother reports that infant nursed less than an hour ago before the lactation visit. He was only able to transfer 0.6 oz from the breast today. However, mom did report that infant recently fed less than an hour before the lactation visit. Infant required frequent stimulation. Given  weight loss and poor milk transfer noted today, recommended to continue breast-feeding, limit breast-feeding to 30 minutes total, supplement 2-3 oz after nursing, and pump to continue to help stimulate milk supply.     1.  difficulty in feeding at breast     2.  weight loss         Plan:    Congratulations on your little bundle of germaine, Bharath Garrido.     It was a pleasure meeting you today.    As discussed, below is the feeding recommendations for Bharath Garrido:    Feeding plan: Breast-feed every 2-3 hours or more frequently based on infant cues; at least 8-12 times a day. When latching Bharath Garrido, make sure head, neck, and body are aligned an facing you. Support breast with \"sandwich\" hold or \"C\" hold while infant is breast-feeding. To obtain a deeper latch, aim the tip of the nipple to infant's roof of the mouth (aim for the nose). Ensure lips are flanged out. If " having difficulty, wait for wide gape and gently apply downward pressure to the infant's chin. If latch is painful or lips are pursed in, unlatch Bharath Garrido and reposition. Make sure to stimulate Morris to actively nurse. Listen for swallows. If swallows are less frequent, stimulate him to actively nurse. You may express breast-milk in his mouth to help stimulate sucking. Limit breast-feeding to 30 minutes total and then supplement with pumped milk or formula    The latch should be like this:      Supplementation: Supplement 2-3 oz of pumped milk or formula after breast-feeding.     Age Average milk volume per feeding (mL) Average milk volume per feeding (oz) Average 24 hour milk intake (mL) Average 24 hour milk intake (oz)   Day 1 Few drops - 5mL < tsp Up to 30 mL Up to 1 oz   Day 2 5 - 15 mL <0.5 oz - 1 TB 30 - 120 mL 1 - 4 oz   Day 3 15 - 30 mL  0.5 - 1 oz 120 - 240 mL 4 - 8 oz   Day 4 30 - 45 mL  1 - 1.5 oz 240 - 360 mL 8 - 12 oz   Day 5-7 45 - 60 mL 1.5 - 2 oz 360 - 600 mL 12 - 18 oz   Week 2-3 60 - 90 mL 2 - 3 oz 450 - 750 mL 15 - 25 oz   Months 1-6 90 - 150 mL 3 - 5 oz 750 - 1035 mL 25 - 35 oz      Pumping plan: Pump after nursing about 6-8 times a day or as much as you are able to help increase milk supply. This will add stimulation to your breast for milk production.     Continue to monitor output, expect at least 6 wet diapers per day and 2-4 stools in a day.     Bharath Garrido should start Vitamin D 400 internation units, once daily, when back to birthweight.    Follow up with your primary care provider in 3 days for weight check.    Return to clinic sooner or call clinic sooner for any worsening symptoms (inconsolability, fever greater than 100.4F, less wet diapers, no stools, sleepiness, difficulty feeding, abdominal distention).    For further lactation concerns, please call [phone].    ____________________________________________________________________  SUBJECTIVE:     Bharath Garrido  is a 2 wk.o. old male infant born at Gestational Age: 39w0d via , Low Transverse delivery on 4/3/2020 at 10:02 AM here for lactation support. This patient is accompanied in the office by his mother.     Concerns: weight loss, down 14%. Infant was seen for weight check with PCP today. Mother recently fed baby less than 1 hour before lactation visit.     Baby is nursing every 2-3 hours for about 15-20 minutes on each side.  Mother reports hearing audible swallows.   Baby feeds about 7-8  times in 24 hours and wakes up on own for feeds.  Baby stopped supplementing after milk came in. Last supplementation was 7 days ago. Baby has about 6 wet diapers and 1-3 stools per day. Stools just turned green today.    Mom only pumped once last week and got 1 oz. No longer pumping at this time. Mom noticed her breasts grew larger and areolas darkened during pregnancy and she noticed primary engorgement when her milk came in on day 5-6.    Breastfeeding Goals: breast-feed for at least 6 weeks.     Previous Breastfeeding Experience: breast-fed for 6 months and eventually had poor milk supply.     Breast-surgery: none.    Maternal medications: wellbutrin  Infant medications: none.     Hospital course:  Discharge summary--  Familia Sinha is a DOL1 baby boy former 39w0d born via repeat C section to Mom. Mom has history of genital herpes (no active lesions at time of delivery) and was on oral valacyclovir during pregnancy. Mom also with history of depression, though not on any anti-depressant medication during pregnancy.      Baby is breastfeeding exclusively; Mom nursed her first child who is 6.5 years old for 5.5 months. Mom's first baby did not have jaundice or require phototherapy. Mom's first baby had a sacral dimple and MRI of the spine for her first child showed a benign cyst which did not require any further follow up per Mom.     Mom is O+, baby is B+ and JEAN-PAUL neg. Transcutaneous bilirubin was 4 at 24 hours (low risk)  and he was down 5.5% from birth weight at time of discharge. Only risk factor is exclusive breastfeeding.     Baby does have 4th and 5th toes which are deviated medially, left greater than left. Will defer this to his PCP for outpatient orthopedic referral.     [place] metabolic screening: normal    Patient Active Problem List    Diagnosis Date Noted   ? Deformity of toe 2020     Results for orders placed or performed during the hospital encounter of 20   Cord Blood Evaluation   Result Value Ref Range    ABO Rh B POS     Cord Blood JEAN-PAUL NEG Negative    ABO/Rh Repeat B POS     Genetic Screen   Result Value Ref Range    Scan Result See Scanned Report    POCT Glucose    Specimen: Capillary; Blood   Result Value Ref Range    Glucose 48 (L) 51 - 139 mg/dL     No current outpatient medications on file.  Past Medical History:   Diagnosis Date   ? Sacral dimple in  2020   ? Term , current hospitalization 4/3/2020     No past surgical history on file.  Family History   Problem Relation Age of Onset   ? Anxiety disorder Mother    ? Depression Mother    ? Asthma Mother    ? Allergies Mother    ? Anxiety disorder Brother    ? Depression Brother    ? Allergies Maternal Grandmother    ? Asthma Maternal Grandmother    ? Alzheimer's disease Maternal Grandmother    ? Anxiety disorder Maternal Grandmother    ? Depression Maternal Grandmother    ? Hypertension Maternal Grandfather    ? Dyslipidemia Maternal Grandfather    ? Diabetes Maternal Grandfather    ? Hypertension Paternal Grandmother    ? Dyslipidemia Paternal Grandmother    ? Heart disease Paternal Grandmother    ? Cancer Paternal Grandfather    ? Allergies Maternal Aunt    ? Asthma Maternal Aunt    ? Anxiety disorder Maternal Aunt    ? Depression Maternal Aunt        Primary care provider: Baltazar Chang MD    OBJECTIVE:    Mother:   Nipples are everted, the areola is compressible, the breast is soft and full.     Sore nipples: none    Maternal pain: none    Maternal depression screening: Doing well    Infant:   Vitals:    20 1344   Weight: 6 lb 13.4 oz (3.101 kg)       Average weight gain over last 9 days: down 4 oz     Weight:   Birthweight: 8 lb (3.63 kg)  Clinic weight on 20: 7 lbs 1.5 oz  Today's weight:   Vitals:    20 1344   Weight: 6 lb 13.4 oz (3.101 kg)       -15% from birth weight.    Lactation scale pre-feeding weight: 6 lbs 13.4 oz  Weight after right side feedin lbs 13.4 oz (8 minutes of nursing, few swallows heard)  Weight after left side feedin lbs 14 oz (about 5-6 minutes of nursing, with frequent hand expression)    Amount of milk transferred from RIGHT side: 0 oz  Amount of milk transferred from LEFT side: 0.6 oz    Total transfer: 0.6 oz    Feeding assessment:     Infant can draw gloved finger into mouth. Infant demonstrated a coordinated suck . Infant palate is intact, tongue over gums, normal frenulum.   Infant can hold suction with tongue while at the breast. Infant did require frequent stimulation at the breast. Mom hand expressed to help stimulate sucking. Infant became sleepy after 5-8 minutes of nursing on each side.    Alignment: Infant's head was aligned with its trunk. Infant did face mother. Baby was in cross-cradle position today. Discussed importance of infant ventral positioning to obtain a deeper latch.     Areolar Grasp: Infant was able to open mouth wide. Infant's lips were not pursed. Infant's lips did flange outward. Tongue was visible just barely over bottom lip. Infant had complete seal.     Areolar Compression: Infant made rhythmic motion. There were no clicking or smacking sounds. There was no severe nipple discomfort.  Nipples appeared slightly creased after feeding.    Audible swallowing: Infant made quiet sounds of swallowing. There was an increase in frequency after milk ejection reflex.     PHYSICAL EXAM    Gen: Alert, no acute distress.   Head: Anterior and posterior  "fontanelles are flat and soft.   Eyes: No eye drainage. Mild scleral icterus. Bilateral red reflexes present.   Ears: Pinna appear well-formed. No pits.   Nose: nares patent. No nasal congestion. No nasal flaring.  Mouth: Oral mucosa moist. Tongue midline (tongue elevation adequate when crying, good lateralization). Frenulum normal. No \"heart-shaped\" tongue. Tongue able to extend pass lower gumline. Lips closed at rest.   Neck: Clavicles intact bilaterally.  Lungs: Clear to auscultation bilaterally.   Cardiac: Regular regular rate and rhythm, S1S2, no murmurs. Brachial and femoral pulses +2 and equal.  Abdomen: Soft, nontender, bowel sounds present, no hepatosplenomegaly or mass palpable. Umbilicus dry with no erythema or drainage.   : Luiz stage 1 male genitalia  Skin: Intact. Dry. No rash. Mild facial jaundice.   Musculoskeletal: equal movements of upper and lower extremities. Negative Ortolani and Chew maneuver.  Neuro: Appropriate muscle tone.    The visit lasted a total of 45 minutes that I spent face to face with the patient. Of that time, 40 minutes were spent on lactation. Over 50% of the time was spent counseling and educating the patient about normal  care and growth, breast-feeding, latching, milk supply, pumping, and supplementation.    Completed by:   MONE Owens, CPNP, IBCLC  [facility]  [facility]  2020, 11:54 AM       "

## 2021-07-01 ENCOUNTER — THERAPY VISIT (OUTPATIENT)
Dept: PHYSICAL THERAPY | Facility: CLINIC | Age: 36
End: 2021-07-01
Attending: FAMILY MEDICINE
Payer: COMMERCIAL

## 2021-07-01 DIAGNOSIS — S92.301D CLOSED FRACTURE OF BASE OF METATARSAL BONE OF RIGHT FOOT WITH ROUTINE HEALING, SUBSEQUENT ENCOUNTER: ICD-10-CM

## 2021-07-01 DIAGNOSIS — M79.671 RIGHT FOOT PAIN: ICD-10-CM

## 2021-07-01 PROCEDURE — 97530 THERAPEUTIC ACTIVITIES: CPT | Mod: GP | Performed by: PHYSICAL THERAPIST

## 2021-07-01 PROCEDURE — 97140 MANUAL THERAPY 1/> REGIONS: CPT | Mod: GP | Performed by: PHYSICAL THERAPIST

## 2021-07-01 PROCEDURE — 97161 PT EVAL LOW COMPLEX 20 MIN: CPT | Mod: GP | Performed by: PHYSICAL THERAPIST
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
## 2021-07-03 NOTE — ANESTHESIA PREPROCEDURE EVALUATION
Anesthesia Preprocedure Evaluation by Henri Poole MD at 4/15/2019  1:32 PM     Author: Henri Poole MD Service: -- Author Type: Physician    Filed: 4/15/2019  1:33 PM Date of Service: 4/15/2019  1:32 PM Status: Signed    : Henri Poole MD (Physician)       Anesthesia Evaluation      Patient summary reviewed   No history of anesthetic complications     Airway   Mallampati: II  Neck ROM: full   Pulmonary - normal exam   (+) asthma  mild,  well controlled,                          Cardiovascular - negative ROS and normal exam   Neuro/Psych    (+) anxiety/panic attacks (and PTSD),     Endo/Other       GI/Hepatic/Renal - negative ROS           Dental - normal exam                            Anesthesia Plan  Planned anesthetic: MAC  Versed/fentanyl/propofol  Ketamine PRN  Decadron/zofran  toradol if ok with surgeon  ASA 2   Induction: intravenous   Anesthetic plan and risks discussed with: patient and spouse  Anesthesia plan special considerations: antiemetics,   Post-op plan: routine recovery

## 2021-07-15 ENCOUNTER — THERAPY VISIT (OUTPATIENT)
Dept: PHYSICAL THERAPY | Facility: CLINIC | Age: 36
End: 2021-07-15
Payer: COMMERCIAL

## 2021-07-15 DIAGNOSIS — S92.301D CLOSED FRACTURE OF BASE OF METATARSAL BONE OF RIGHT FOOT WITH ROUTINE HEALING, SUBSEQUENT ENCOUNTER: ICD-10-CM

## 2021-07-15 DIAGNOSIS — M79.671 RIGHT FOOT PAIN: ICD-10-CM

## 2021-07-15 PROCEDURE — 97110 THERAPEUTIC EXERCISES: CPT | Mod: GP | Performed by: PHYSICAL THERAPY ASSISTANT

## 2021-07-15 PROCEDURE — 97140 MANUAL THERAPY 1/> REGIONS: CPT | Mod: GP | Performed by: PHYSICAL THERAPY ASSISTANT

## 2021-07-21 ENCOUNTER — THERAPY VISIT (OUTPATIENT)
Dept: PHYSICAL THERAPY | Facility: CLINIC | Age: 36
End: 2021-07-21
Payer: COMMERCIAL

## 2021-07-21 DIAGNOSIS — S92.301D CLOSED FRACTURE OF BASE OF METATARSAL BONE OF RIGHT FOOT WITH ROUTINE HEALING, SUBSEQUENT ENCOUNTER: ICD-10-CM

## 2021-07-21 DIAGNOSIS — M79.671 RIGHT FOOT PAIN: ICD-10-CM

## 2021-07-21 PROCEDURE — 97530 THERAPEUTIC ACTIVITIES: CPT | Mod: GP | Performed by: PHYSICAL THERAPY ASSISTANT

## 2021-07-21 PROCEDURE — 97110 THERAPEUTIC EXERCISES: CPT | Mod: GP | Performed by: PHYSICAL THERAPY ASSISTANT

## 2021-07-21 PROCEDURE — 97140 MANUAL THERAPY 1/> REGIONS: CPT | Mod: GP | Performed by: PHYSICAL THERAPY ASSISTANT

## 2021-07-29 ENCOUNTER — THERAPY VISIT (OUTPATIENT)
Dept: PHYSICAL THERAPY | Facility: CLINIC | Age: 36
End: 2021-07-29
Payer: COMMERCIAL

## 2021-07-29 DIAGNOSIS — S92.301D CLOSED FRACTURE OF BASE OF METATARSAL BONE OF RIGHT FOOT WITH ROUTINE HEALING, SUBSEQUENT ENCOUNTER: ICD-10-CM

## 2021-07-29 DIAGNOSIS — M79.671 RIGHT FOOT PAIN: ICD-10-CM

## 2021-07-29 PROCEDURE — 97112 NEUROMUSCULAR REEDUCATION: CPT | Mod: GP | Performed by: PHYSICAL THERAPIST

## 2021-07-29 PROCEDURE — 97110 THERAPEUTIC EXERCISES: CPT | Mod: GP | Performed by: PHYSICAL THERAPIST

## 2021-08-05 ENCOUNTER — THERAPY VISIT (OUTPATIENT)
Dept: PHYSICAL THERAPY | Facility: CLINIC | Age: 36
End: 2021-08-05
Payer: COMMERCIAL

## 2021-08-05 DIAGNOSIS — M79.671 RIGHT FOOT PAIN: ICD-10-CM

## 2021-08-05 DIAGNOSIS — S92.301D CLOSED FRACTURE OF BASE OF METATARSAL BONE OF RIGHT FOOT WITH ROUTINE HEALING, SUBSEQUENT ENCOUNTER: ICD-10-CM

## 2021-08-05 PROCEDURE — 97110 THERAPEUTIC EXERCISES: CPT | Mod: GP | Performed by: PHYSICAL THERAPIST

## 2021-08-05 PROCEDURE — 97530 THERAPEUTIC ACTIVITIES: CPT | Mod: GP | Performed by: PHYSICAL THERAPIST

## 2021-08-06 ENCOUNTER — ANCILLARY PROCEDURE (OUTPATIENT)
Dept: GENERAL RADIOLOGY | Facility: CLINIC | Age: 36
End: 2021-08-06
Attending: FAMILY MEDICINE
Payer: COMMERCIAL

## 2021-08-06 ENCOUNTER — OFFICE VISIT (OUTPATIENT)
Dept: ORTHOPEDICS | Facility: CLINIC | Age: 36
End: 2021-08-06
Payer: COMMERCIAL

## 2021-08-06 VITALS — HEIGHT: 70 IN | BODY MASS INDEX: 25.77 KG/M2 | WEIGHT: 180 LBS

## 2021-08-06 DIAGNOSIS — S92.351D CLOSED DISPLACED FRACTURE OF FIFTH METATARSAL BONE OF RIGHT FOOT WITH ROUTINE HEALING, SUBSEQUENT ENCOUNTER: ICD-10-CM

## 2021-08-06 DIAGNOSIS — S92.211D CLOSED DISPLACED FRACTURE OF CUBOID OF RIGHT FOOT WITH ROUTINE HEALING, SUBSEQUENT ENCOUNTER: Primary | ICD-10-CM

## 2021-08-06 DIAGNOSIS — S92.211D CLOSED DISPLACED FRACTURE OF CUBOID OF RIGHT FOOT WITH ROUTINE HEALING, SUBSEQUENT ENCOUNTER: ICD-10-CM

## 2021-08-06 PROCEDURE — 99213 OFFICE O/P EST LOW 20 MIN: CPT | Performed by: FAMILY MEDICINE

## 2021-08-06 PROCEDURE — 73630 X-RAY EXAM OF FOOT: CPT | Mod: RT | Performed by: RADIOLOGY

## 2021-08-06 ASSESSMENT — PATIENT HEALTH QUESTIONNAIRE - PHQ9: SUM OF ALL RESPONSES TO PHQ QUESTIONS 1-9: 0

## 2021-08-06 ASSESSMENT — MIFFLIN-ST. JEOR: SCORE: 1572.75

## 2021-08-06 NOTE — LETTER
"  8/6/2021      RE: Balta Garrido  [address] [facility] [id number]        Subjective:   Balta Garrido is a 36 year old female who is following up on a right foot fx.  Seen by PT.  Unable to start running until she can hop.  Pt with less pain, some pain on feet for longer periods.  More of an ache.  Walking is OK, worse if over does it.    Date of injury: 5/7/2021  Date last seen: 6/23/2021  Following Therapeutic Plan: Yes   Pain: Improving  Function: Improving  Interval History: Nothing new     PAST MEDICAL, SOCIAL, SURGICAL AND FAMILY HISTORY: She  has a past medical history of Asthma, Depression, and Low back pain.  She  has a past surgical history that includes Tonsillectomy; Lumbar Laminectomy (Left, 12/19/2017); and Dilation and curettage (N/A, 4/15/2019).  Her family history includes Alzheimer Disease in her mother; Cerebrovascular Disease in her mother; Diabetes in her father, paternal grandfather, and paternal uncle; Hypothyroidism in her mother and sister.  She reports that she has never smoked. She has never used smokeless tobacco. She reports current alcohol use. She reports that she does not use drugs.    ALLERGIES: She is allergic to percocet [oxycodone-acetaminophen].    CURRENT MEDICATIONS: She has a current medication list which includes the following prescription(s): albuterol, bupropion, bupropion, calcium carb-cholecalciferol, fexofenadine, fluticasone-salmeterol, gianvi, hydrocodone-acetaminophen, and montelukast.     REVIEW OF SYSTEMS: 10 point review of systems is negative except as noted above.     Exam:   Ht 1.772 m (5' 9.75\")   Wt 81.6 kg (180 lb)   BMI 26.01 kg/m             CONSTITUTIONAL: healthy, alert and no distress  HEAD: Normocephalic. No masses, lesions, tenderness or abnormalities  SKIN: no suspicious lesions or rashes  GAIT: normal  NEUROLOGIC: Non-focal, Normal muscle tone and strength, reflexes normal, sensation grossly normal.  PSYCHIATRIC: affect normal/bright and " mentation appears normal.    MUSCULOSKELETAL: right foot pain    ANKLE  Inspection/Palpation:     Swelling: no swelling      Non-tender: ATFL, CFL, PTFL, medial malleolus, deltoid ligament, anterior tib-fib ligament, achilles  tendon, no achilles tendon defect   Range of Motion: dorsiflexion: full, plantarflexion: full, inversion: full, eversion: full  Strength:dorsiflexion: 5/5, plantarflexion: 5/5, inversion: 5/5, eversion: 5/5   Special tests: negative anterior drawer, negative varus stress, negative valgus stress, negative forced external rotation, negative Ng sign     FOOT  Inspection/Palpation:      Swelling: no swelling  Tender: Proximal 5th metatarsal     Non-tender: 1st, 2nd, 3rd, 4th metatarsals, calcaneous, cuboid,  navicular, cuneiform lateral, cuneiform middle, cuneiform medial, metatarsal heads, peroneal tendon: at lateral malleolus, at cuboid, at proximal 5th metatarsal, posterior tibial tendon at medial malleolus, posterior tibial tendon at navicular, plantar fascia  Range of Motion: flexion of toes: full, extension of toes: full         Assessment/Plan:   Pt is a 35 yo white female with PMhx of SHARLA presenting in f/u for right foot fractures  1. Right foot proximal 5th, cuboid fracture-  Healing well  Advancing walking, needs to be able to hop before we can return her to running program  Continue to work with PT    RTC prn  X-RAY INTERPRETATION:   X-Ray of the Right Foot: 3-view, ap, lateral, oblique   ordered and interpreted in the office today was positive for increased healing of proximal 5th metatarsal      Ebonie Allen MD

## 2021-08-06 NOTE — PROGRESS NOTES
" Subjective:   Balta Garrido is a 36 year old female who is following up on a right foot fx.  Seen by PT.  Unable to start running until she can hop.  Pt with less pain, some pain on feet for longer periods.  More of an ache.  Walking is OK, worse if over does it.    Date of injury: 5/7/2021  Date last seen: 6/23/2021  Following Therapeutic Plan: Yes   Pain: Improving  Function: Improving  Interval History: Nothing new     PAST MEDICAL, SOCIAL, SURGICAL AND FAMILY HISTORY: She  has a past medical history of Asthma, Depression, and Low back pain.  She  has a past surgical history that includes Tonsillectomy; Lumbar Laminectomy (Left, 12/19/2017); and Dilation and curettage (N/A, 4/15/2019).  Her family history includes Alzheimer Disease in her mother; Cerebrovascular Disease in her mother; Diabetes in her father, paternal grandfather, and paternal uncle; Hypothyroidism in her mother and sister.  She reports that she has never smoked. She has never used smokeless tobacco. She reports current alcohol use. She reports that she does not use drugs.    ALLERGIES: She is allergic to percocet [oxycodone-acetaminophen].    CURRENT MEDICATIONS: She has a current medication list which includes the following prescription(s): albuterol, bupropion, bupropion, calcium carb-cholecalciferol, fexofenadine, fluticasone-salmeterol, gianvi, hydrocodone-acetaminophen, and montelukast.     REVIEW OF SYSTEMS: 10 point review of systems is negative except as noted above.     Exam:   Ht 1.772 m (5' 9.75\")   Wt 81.6 kg (180 lb)   BMI 26.01 kg/m             CONSTITUTIONAL: healthy, alert and no distress  HEAD: Normocephalic. No masses, lesions, tenderness or abnormalities  SKIN: no suspicious lesions or rashes  GAIT: normal  NEUROLOGIC: Non-focal, Normal muscle tone and strength, reflexes normal, sensation grossly normal.  PSYCHIATRIC: affect normal/bright and mentation appears normal.    MUSCULOSKELETAL: right foot " pain    ANKLE  Inspection/Palpation:     Swelling: no swelling      Non-tender: ATFL, CFL, PTFL, medial malleolus, deltoid ligament, anterior tib-fib ligament, achilles  tendon, no achilles tendon defect   Range of Motion: dorsiflexion: full, plantarflexion: full, inversion: full, eversion: full  Strength:dorsiflexion: 5/5, plantarflexion: 5/5, inversion: 5/5, eversion: 5/5   Special tests: negative anterior drawer, negative varus stress, negative valgus stress, negative forced external rotation, negative Ng sign     FOOT  Inspection/Palpation:      Swelling: no swelling  Tender: Proximal 5th metatarsal     Non-tender: 1st, 2nd, 3rd, 4th metatarsals, calcaneous, cuboid,  navicular, cuneiform lateral, cuneiform middle, cuneiform medial, metatarsal heads, peroneal tendon: at lateral malleolus, at cuboid, at proximal 5th metatarsal, posterior tibial tendon at medial malleolus, posterior tibial tendon at navicular, plantar fascia  Range of Motion: flexion of toes: full, extension of toes: full         Assessment/Plan:   Pt is a 37 yo white female with PMhx of SHARLA presenting in f/u for right foot fractures  1. Right foot proximal 5th, cuboid fracture-  Healing well  Advancing walking, needs to be able to hop before we can return her to running program  Continue to work with PT    RTC prn  X-RAY INTERPRETATION:   X-Ray of the Right Foot: 3-view, ap, lateral, oblique   ordered and interpreted in the office today was positive for increased healing of proximal 5th metatarsal

## 2021-08-17 ENCOUNTER — THERAPY VISIT (OUTPATIENT)
Dept: PHYSICAL THERAPY | Facility: CLINIC | Age: 36
End: 2021-08-17
Payer: COMMERCIAL

## 2021-08-17 DIAGNOSIS — M79.671 RIGHT FOOT PAIN: ICD-10-CM

## 2021-08-17 DIAGNOSIS — S92.301D CLOSED FRACTURE OF BASE OF METATARSAL BONE OF RIGHT FOOT WITH ROUTINE HEALING, SUBSEQUENT ENCOUNTER: ICD-10-CM

## 2021-08-17 PROCEDURE — 97110 THERAPEUTIC EXERCISES: CPT | Mod: GP | Performed by: PHYSICAL THERAPIST

## 2021-08-31 ENCOUNTER — THERAPY VISIT (OUTPATIENT)
Dept: PHYSICAL THERAPY | Facility: CLINIC | Age: 36
End: 2021-08-31
Payer: COMMERCIAL

## 2021-08-31 DIAGNOSIS — M79.671 RIGHT FOOT PAIN: ICD-10-CM

## 2021-08-31 DIAGNOSIS — S92.301D CLOSED FRACTURE OF BASE OF METATARSAL BONE OF RIGHT FOOT WITH ROUTINE HEALING, SUBSEQUENT ENCOUNTER: ICD-10-CM

## 2021-08-31 PROCEDURE — 97530 THERAPEUTIC ACTIVITIES: CPT | Mod: GP | Performed by: PHYSICAL THERAPY ASSISTANT

## 2021-08-31 PROCEDURE — 97110 THERAPEUTIC EXERCISES: CPT | Mod: GP | Performed by: PHYSICAL THERAPY ASSISTANT

## 2021-10-10 ENCOUNTER — HEALTH MAINTENANCE LETTER (OUTPATIENT)
Age: 36
End: 2021-10-10

## 2021-11-22 ENCOUNTER — LAB REQUISITION (OUTPATIENT)
Dept: LAB | Facility: CLINIC | Age: 36
End: 2021-11-22

## 2021-11-22 DIAGNOSIS — E66.3 OVERWEIGHT: ICD-10-CM

## 2021-11-22 DIAGNOSIS — Z13.220 ENCOUNTER FOR SCREENING FOR LIPOID DISORDERS: ICD-10-CM

## 2021-11-22 LAB
ALBUMIN SERPL-MCNC: 4 G/DL (ref 3.5–5)
ALP SERPL-CCNC: 71 U/L (ref 45–120)
ALT SERPL W P-5'-P-CCNC: 25 U/L (ref 0–45)
ANION GAP SERPL CALCULATED.3IONS-SCNC: 8 MMOL/L (ref 5–18)
ANION GAP SERPL CALCULATED.3IONS-SCNC: 8 MMOL/L (ref 5–18)
AST SERPL W P-5'-P-CCNC: 18 U/L (ref 0–40)
BILIRUB SERPL-MCNC: 1.3 MG/DL (ref 0–1)
BUN SERPL-MCNC: 12 MG/DL (ref 8–22)
BUN SERPL-MCNC: 12 MG/DL (ref 8–22)
CALCIUM SERPL-MCNC: 9.2 MG/DL (ref 8.5–10.5)
CALCIUM SERPL-MCNC: 9.2 MG/DL (ref 8.5–10.5)
CHLORIDE BLD-SCNC: 106 MMOL/L (ref 98–107)
CHLORIDE BLD-SCNC: 106 MMOL/L (ref 98–107)
CHOLEST SERPL-MCNC: 160 MG/DL
CO2 SERPL-SCNC: 25 MMOL/L (ref 22–31)
CO2 SERPL-SCNC: 25 MMOL/L (ref 22–31)
CREAT SERPL-MCNC: 0.74 MG/DL (ref 0.6–1.1)
CREAT SERPL-MCNC: 0.74 MG/DL (ref 0.6–1.1)
FASTING STATUS PATIENT QL REPORTED: ABNORMAL
GFR SERPL CREATININE-BSD FRML MDRD: >90 ML/MIN/1.73M2
GFR SERPL CREATININE-BSD FRML MDRD: >90 ML/MIN/1.73M2
GLUCOSE BLD-MCNC: 92 MG/DL (ref 70–125)
GLUCOSE BLD-MCNC: 92 MG/DL (ref 70–125)
HDLC SERPL-MCNC: 47 MG/DL
LDLC SERPL CALC-MCNC: 96 MG/DL
POTASSIUM BLD-SCNC: 4.3 MMOL/L (ref 3.5–5)
POTASSIUM BLD-SCNC: 4.3 MMOL/L (ref 3.5–5)
PROT SERPL-MCNC: 6.9 G/DL (ref 6–8)
SODIUM SERPL-SCNC: 139 MMOL/L (ref 136–145)
SODIUM SERPL-SCNC: 139 MMOL/L (ref 136–145)
TRIGL SERPL-MCNC: 84 MG/DL
TSH SERPL DL<=0.005 MIU/L-ACNC: 1.02 UIU/ML (ref 0.3–5)

## 2021-11-22 PROCEDURE — 80053 COMPREHEN METABOLIC PANEL: CPT | Performed by: FAMILY MEDICINE

## 2021-11-22 PROCEDURE — 80061 LIPID PANEL: CPT | Performed by: FAMILY MEDICINE

## 2021-11-22 PROCEDURE — 84443 ASSAY THYROID STIM HORMONE: CPT | Performed by: FAMILY MEDICINE

## 2022-03-26 ENCOUNTER — OFFICE VISIT (OUTPATIENT)
Dept: ORTHOPEDICS | Facility: CLINIC | Age: 37
End: 2022-03-26
Payer: COMMERCIAL

## 2022-03-26 VITALS — HEIGHT: 70 IN | BODY MASS INDEX: 25.77 KG/M2 | WEIGHT: 180 LBS

## 2022-03-26 DIAGNOSIS — S92.301D CLOSED FRACTURE OF BASE OF METATARSAL BONE OF RIGHT FOOT WITH ROUTINE HEALING, SUBSEQUENT ENCOUNTER: Primary | ICD-10-CM

## 2022-03-26 PROCEDURE — 99212 OFFICE O/P EST SF 10 MIN: CPT | Performed by: FAMILY MEDICINE

## 2022-03-26 ASSESSMENT — PATIENT HEALTH QUESTIONNAIRE - PHQ9: SUM OF ALL RESPONSES TO PHQ QUESTIONS 1-9: 0

## 2022-03-26 NOTE — LETTER
"  3/26/2022      RE: Balta Garrido  [address] [facility] [address]        Subjective:   Balta Garrido is a 36 year old female who is following up on right foot pain.  Right foot has been feeling ok. Pt reports she's following a  Return to Run program.  Tylenol taken.  Not causing many issues.  Had to reschedule PT, doing activities at home.      Date of injury: 5 /7/21  Date last seen: 8/6/21  Following Therapeutic Plan: Yes PT visits  Pain: Improving  Function: Improving    PAST MEDICAL, SOCIAL, SURGICAL AND FAMILY HISTORY: She  has a past medical history of Asthma, Depression, and Low back pain.  She  has a past surgical history that includes Tonsillectomy; Lumbar Laminectomy (Left, 12/19/2017); and Dilation and curettage (N/A, 4/15/2019).  Her family history includes Alzheimer Disease in her mother; Cerebrovascular Disease in her mother; Diabetes in her father, paternal grandfather, and paternal uncle; Hypothyroidism in her mother and sister.  She reports that she has never smoked. She has never used smokeless tobacco. She reports current alcohol use. She reports that she does not use drugs.    ALLERGIES: She is allergic to percocet [oxycodone-acetaminophen].    CURRENT MEDICATIONS: She has a current medication list which includes the following prescription(s): albuterol, bupropion, bupropion, calcium carb-cholecalciferol, fexofenadine, fluticasone-salmeterol, gianvi, hydrocodone-acetaminophen, and montelukast.     REVIEW OF SYSTEMS: 10 point review of systems is negative except as noted above.     Exam:   Ht 1.772 m (5' 9.75\")   Wt 81.6 kg (180 lb)   BMI 26.01 kg/m             CONSTITUTIONAL: healthy, alert and no distress  HEAD: Normocephalic. No masses, lesions, tenderness or abnormalities  SKIN: no suspicious lesions or rashes  GAIT: normal  NEUROLOGIC: Non-focal  PSYCHIATRIC: affect normal/bright and mentation appears normal.    MUSCULOSKELETAL: right foot    ANKLE  Inspection/Palpation:     " Swelling: no swelling      Non-tender: ATFL, CFL, PTFL, medial malleolus, deltoid ligament, anterior tib-fib ligament, achilles  tendon, no achilles tendon defect   Range of Motion: dorsiflexion: full, plantarflexion: full, inversion: full, eversion: full  Strength:dorsiflexion: 5/5, plantarflexion: 5/5, inversion: 5/5, eversion: 5/5   Special tests: negative anterior drawer, negative varus stress, negative valgus stress, negative forced external rotation, negative Ng sign     FOOT  Inspection/Palpation:      Swelling: no swelling  Tender: mild proximal 5th MTP     Non-tender: 1st, 2nd, 3rd, 4th, 5th metatarsals, calcaneous, cuboid,  navicular, cuneiform lateral, cuneiform middle, cuneiform medial, metatarsal heads, peroneal tendon: at lateral malleolus, at cuboid, at proximal 5th metatarsal, posterior tibial tendon at medial malleolus, posterior tibial tendon at navicular, plantar fascia  Range of Motion: flexion of toes: full, extension of toes: full       Assessment/Plan:   Pt is a 35 yo white female with PMhx of asthma presenting with right 5th proximal 5th fracture    1. Right 5th proximal 5th fracture-  Patient has not been able to return to most activities  Patient does not have excessive painful areas on exam today  Mild tenderness at the fifth metatarsal  Patient has returned to work and is able to do her activities of daily living    Clinically the patient has healed and is cleared for all activities  Return to clinic as needed  X-RAY INTERPRETATION:   none      Ebonie Allen MD

## 2022-03-26 NOTE — PROGRESS NOTES
" Subjective:   Balta Garrido is a 36 year old female who is following up on right foot pain.  Right foot has been feeling ok. Pt reports she's following a  Return to Run program.  Tylenol taken.  Not causing many issues.  Had to reschedule PT, doing activities at home.      Date of injury: 5 /7/21  Date last seen: 8/6/21  Following Therapeutic Plan: Yes PT visits  Pain: Improving  Function: Improving    PAST MEDICAL, SOCIAL, SURGICAL AND FAMILY HISTORY: She  has a past medical history of Asthma, Depression, and Low back pain.  She  has a past surgical history that includes Tonsillectomy; Lumbar Laminectomy (Left, 12/19/2017); and Dilation and curettage (N/A, 4/15/2019).  Her family history includes Alzheimer Disease in her mother; Cerebrovascular Disease in her mother; Diabetes in her father, paternal grandfather, and paternal uncle; Hypothyroidism in her mother and sister.  She reports that she has never smoked. She has never used smokeless tobacco. She reports current alcohol use. She reports that she does not use drugs.    ALLERGIES: She is allergic to percocet [oxycodone-acetaminophen].    CURRENT MEDICATIONS: She has a current medication list which includes the following prescription(s): albuterol, bupropion, bupropion, calcium carb-cholecalciferol, fexofenadine, fluticasone-salmeterol, gianvi, hydrocodone-acetaminophen, and montelukast.     REVIEW OF SYSTEMS: 10 point review of systems is negative except as noted above.     Exam:   Ht 1.772 m (5' 9.75\")   Wt 81.6 kg (180 lb)   BMI 26.01 kg/m             CONSTITUTIONAL: healthy, alert and no distress  HEAD: Normocephalic. No masses, lesions, tenderness or abnormalities  SKIN: no suspicious lesions or rashes  GAIT: normal  NEUROLOGIC: Non-focal  PSYCHIATRIC: affect normal/bright and mentation appears normal.    MUSCULOSKELETAL: right foot    ANKLE  Inspection/Palpation:     Swelling: no swelling      Non-tender: ATFL, CFL, PTFL, medial malleolus, deltoid " ligament, anterior tib-fib ligament, achilles  tendon, no achilles tendon defect   Range of Motion: dorsiflexion: full, plantarflexion: full, inversion: full, eversion: full  Strength:dorsiflexion: 5/5, plantarflexion: 5/5, inversion: 5/5, eversion: 5/5   Special tests: negative anterior drawer, negative varus stress, negative valgus stress, negative forced external rotation, negative Ng sign     FOOT  Inspection/Palpation:      Swelling: no swelling  Tender: mild proximal 5th MTP     Non-tender: 1st, 2nd, 3rd, 4th, 5th metatarsals, calcaneous, cuboid,  navicular, cuneiform lateral, cuneiform middle, cuneiform medial, metatarsal heads, peroneal tendon: at lateral malleolus, at cuboid, at proximal 5th metatarsal, posterior tibial tendon at medial malleolus, posterior tibial tendon at navicular, plantar fascia  Range of Motion: flexion of toes: full, extension of toes: full       Assessment/Plan:   Pt is a 37 yo white female with PMhx of asthma presenting with right 5th proximal 5th fracture    1. Right 5th proximal 5th fracture-  Patient has not been able to return to most activities  Patient does not have excessive painful areas on exam today  Mild tenderness at the fifth metatarsal  Patient has returned to work and is able to do her activities of daily living    Clinically the patient has healed and is cleared for all activities  Return to clinic as needed  X-RAY INTERPRETATION:   none

## 2022-09-18 ENCOUNTER — HEALTH MAINTENANCE LETTER (OUTPATIENT)
Age: 37
End: 2022-09-18

## 2022-12-31 NOTE — ANESTHESIA CARE TRANSFER NOTE
Last vitals:   Vitals:    12/19/17 1443   BP: 135/75   Pulse: 92   Resp: 16   Temp: 36.2  C (97.2  F)   SpO2: 100%     Patient's level of consciousness is drowsy  Spontaneous respirations: yes  Maintains airway independently: yes  Dentition unchanged: yes  Oropharynx: oropharynx clear of all foreign objects    QCDR Measures:  ASA# 20 - Surgical Safety Checklist: WHO surgical safety checklist completed prior to induction  PQRS# 430 - Adult PONV Prevention: 4558F - Pt received => 2 anti-emetic agents (different classes) preop & intraop  ASA# 8 - Peds PONV Prevention: NA - Not pediatric patient, not GA or 2 or more risk factors NOT present  PQRS# 424 - Jaclyn-op Temp Management: 4559F - At least one body temp DOCUMENTED => 35.5C or 95.9F within required timeframe  PQRS# 426 - PACU Transfer Protocol: - Transfer of care checklist used  ASA# 14 - Acute Post-op Pain: ASA14B - Patient did NOT experience pain >= 7 out of 10  
Yes

## 2023-01-28 ENCOUNTER — HEALTH MAINTENANCE LETTER (OUTPATIENT)
Age: 38
End: 2023-01-28

## 2023-03-27 ENCOUNTER — LAB REQUISITION (OUTPATIENT)
Dept: LAB | Facility: CLINIC | Age: 38
End: 2023-03-27

## 2023-03-27 DIAGNOSIS — Z13.220 ENCOUNTER FOR SCREENING FOR LIPOID DISORDERS: ICD-10-CM

## 2023-03-27 LAB
CHOLEST SERPL-MCNC: 156 MG/DL
HDLC SERPL-MCNC: 52 MG/DL
LDLC SERPL CALC-MCNC: 87 MG/DL
NONHDLC SERPL-MCNC: 104 MG/DL
TRIGL SERPL-MCNC: 84 MG/DL

## 2023-03-27 PROCEDURE — 80061 LIPID PANEL: CPT | Performed by: FAMILY MEDICINE

## 2024-02-25 ENCOUNTER — HEALTH MAINTENANCE LETTER (OUTPATIENT)
Age: 39
End: 2024-02-25

## 2024-02-26 ENCOUNTER — PATIENT OUTREACH (OUTPATIENT)
Dept: CARE COORDINATION | Facility: CLINIC | Age: 39
End: 2024-02-26
Payer: COMMERCIAL

## 2024-03-11 ENCOUNTER — PATIENT OUTREACH (OUTPATIENT)
Dept: CARE COORDINATION | Facility: CLINIC | Age: 39
End: 2024-03-11
Payer: COMMERCIAL

## 2024-05-05 ENCOUNTER — HEALTH MAINTENANCE LETTER (OUTPATIENT)
Age: 39
End: 2024-05-05

## 2024-06-03 ENCOUNTER — LAB REQUISITION (OUTPATIENT)
Dept: LAB | Facility: CLINIC | Age: 39
End: 2024-06-03

## 2024-06-03 DIAGNOSIS — F34.1 DYSTHYMIC DISORDER: ICD-10-CM

## 2024-06-03 DIAGNOSIS — Z01.419 ENCOUNTER FOR GYNECOLOGICAL EXAMINATION (GENERAL) (ROUTINE) WITHOUT ABNORMAL FINDINGS: ICD-10-CM

## 2024-06-03 PROCEDURE — 87624 HPV HI-RISK TYP POOLED RSLT: CPT | Performed by: FAMILY MEDICINE

## 2024-06-03 PROCEDURE — 84443 ASSAY THYROID STIM HORMONE: CPT | Performed by: FAMILY MEDICINE

## 2024-06-03 PROCEDURE — G0145 SCR C/V CYTO,THINLAYER,RESCR: HCPCS | Performed by: FAMILY MEDICINE

## 2024-06-04 LAB
HPV HR 12 DNA CVX QL NAA+PROBE: NEGATIVE
HPV16 DNA CVX QL NAA+PROBE: NEGATIVE
HPV18 DNA CVX QL NAA+PROBE: NEGATIVE
HUMAN PAPILLOMA VIRUS FINAL DIAGNOSIS: NORMAL
TSH SERPL DL<=0.005 MIU/L-ACNC: 1.45 UIU/ML (ref 0.3–4.2)

## 2024-06-06 LAB
BKR LAB AP GYN ADEQUACY: NORMAL
BKR LAB AP GYN INTERPRETATION: NORMAL
PATH REPORT.COMMENTS IMP SPEC: NORMAL
PATH REPORT.COMMENTS IMP SPEC: NORMAL

## 2024-10-28 ENCOUNTER — HOSPITAL ENCOUNTER (OUTPATIENT)
Dept: MAMMOGRAPHY | Facility: CLINIC | Age: 39
Discharge: HOME | End: 2024-10-28
Attending: FAMILY MEDICINE | Admitting: FAMILY MEDICINE
Payer: COMMERCIAL

## 2024-10-28 DIAGNOSIS — Z12.39 SCREENING FOR BREAST CANCER: ICD-10-CM

## 2024-10-28 PROCEDURE — 77063 BREAST TOMOSYNTHESIS BI: CPT

## 2024-11-15 ENCOUNTER — ANCILLARY PROCEDURE (OUTPATIENT)
Dept: MAMMOGRAPHY | Facility: CLINIC | Age: 39
End: 2024-11-15
Attending: FAMILY MEDICINE
Payer: COMMERCIAL

## 2024-11-15 DIAGNOSIS — R92.8 ABNORMAL MAMMOGRAM: ICD-10-CM

## 2024-11-15 PROCEDURE — 77061 BREAST TOMOSYNTHESIS UNI: CPT | Mod: LT

## 2024-11-15 PROCEDURE — 77065 DX MAMMO INCL CAD UNI: CPT | Mod: LT

## 2025-04-21 ENCOUNTER — VIRTUAL VISIT (OUTPATIENT)
Dept: PHARMACY | Facility: PHYSICIAN GROUP | Age: 40
End: 2025-04-21
Payer: COMMERCIAL

## 2025-04-21 DIAGNOSIS — F32.9 MAJOR DEPRESSION, CHRONIC: ICD-10-CM

## 2025-04-21 DIAGNOSIS — Z78.9 TAKES DIETARY SUPPLEMENTS: ICD-10-CM

## 2025-04-21 DIAGNOSIS — J30.1 SEASONAL ALLERGIC RHINITIS DUE TO POLLEN: ICD-10-CM

## 2025-04-21 DIAGNOSIS — E66.811 CLASS 1 OBESITY WITHOUT SERIOUS COMORBIDITY WITH BODY MASS INDEX (BMI) OF 30.0 TO 30.9 IN ADULT, UNSPECIFIED OBESITY TYPE: Primary | ICD-10-CM

## 2025-04-21 DIAGNOSIS — J30.2 SEASONAL ALLERGIES: ICD-10-CM

## 2025-04-21 PROCEDURE — 99605 MTMS BY PHARM NP 15 MIN: CPT | Mod: 93 | Performed by: PHARMACIST

## 2025-04-21 RX ORDER — OLOPATADINE HYDROCHLORIDE 7 MG/ML
1 SOLUTION OPHTHALMIC DAILY
COMMUNITY

## 2025-04-21 RX ORDER — BUDESONIDE AND FORMOTEROL FUMARATE DIHYDRATE 160; 4.5 UG/1; UG/1
2 AEROSOL RESPIRATORY (INHALATION) 2 TIMES DAILY
COMMUNITY

## 2025-04-21 RX ORDER — VITAMIN A, VITAMIN C, VITAMIN D-3, VITAMIN E, VITAMIN B-1, VITAMIN B-2, NIACIN, VITAMIN B-6, CALCIUM, IRON, ZINC, COPPER 4000; 120; 400; 22; 1.84; 3; 20; 10; 1; 12; 200; 27; 25; 2 [IU]/1; MG/1; [IU]/1; MG/1; MG/1; MG/1; MG/1; MG/1; MG/1; UG/1; MG/1; MG/1; MG/1; MG/1
1 TABLET ORAL DAILY
COMMUNITY

## 2025-04-21 NOTE — PROGRESS NOTES
Medication Therapy Management (MTM) Encounter    ASSESSMENT:                            Medication Adherence/Access: No issues identified.    Weight Management  would benefit from continuing GLP-1.  We discussed online pharmacies that provide GLP-1 medications and that you cannot guarantee what the product is- we discussed the option to get tirzepatide vials from [facility] as a reputable source of GLP-1 that is less cost prohibitive  Reviewed online ordering and injection administration from a vial    Allergy   Stable    Asthma   Stable    Depression  stable    Supplements   stable    PLAN:                            Start Zepbound Vials 2.5 mg weekly through [facility] - they will text/email you a link to order and it usually ships in 1-2 days.   Take symbicort 2 puffs twice daily instead of Advair    Follow-up: when needed     SUBJECTIVE/OBJECTIVE:                          Balta Garrido is a 39 year old female seen for an initial visit. She was referred to me from Sharon Huffman MD.      Reason for visit: GLP-1- compounded semaglutide    Allergies/ADRs: Reviewed in chart  Past Medical History: Reviewed in chart  Tobacco: She reports that she has never smoked. She has never used smokeless tobacco.  Alcohol: Less than 1 beverage / month    Medication Adherence/Access: no issues reported.    Weight Management   -semaglutide 1 mg / 20 units weekly    Patient reports no current medication side effects. Working well to help with weight loss   Nutrition/Eating Habits: cravings came right back when she stopped semaglutide in the past  Exercise/Activity: active lifestyle.     Initial Consult Weight: 185 lb pre semaglutide, has been as low as 163 lb      Allergy   -Allegra Allergy 180 MG Tablet 1 tablet Swallow whole with water; do not take with fruit juices. Orally Once a day.  -Pataday 0.7 % Solution 1 drop into affected eye Ophthalmic Once a day.  Patient reports no current medication  side effects.    Primary symptoms are post-nasal drip, runny nose, itchy eyes, and sneezing.  Primary triggers are pollens.   Patient feels that current therapy is effective.        Asthma   -Fluticasone-Salmeterol 250-50 MCG/ACT Aerosol Powder Breath Activated INHALE 1 PUFF TWICE DAILY, RINSE MOUTH AFTER USE.  -Ventolin  (90 Base) MCG/ACT Aerosol Solution 2 PUFF(S) INHALED EVERY 4 HRS.       Advair is on back order, requesting alternate steroid inhaler so she does not have to wait   Patient reports no current medication side effects.      Triggers include: pollens.  Patient reports the following symptoms: increased need of albuterol.     Mental Health   Depression  -buPROPion HCl ER (XL) 300 MG Tablet Extended Release 24 Hour TAKE 1 TABLET BY MOUTH EVERY DAY.   Patient reports no current medication side effects.  Patient reports symptoms are stable.       Supplements   -Calcium 500/D 500-400 MG-UNIT Tablet Chewable 1 tab(s) chewed 2 times a day.  -Prenatal One Daily 27-0.8 MG Tablet as directed.    No reported issues at this time.          Today's Vitals: There were no vitals taken for this visit.  ----------------      I spent 9 minutes with this patient today. All changes were made via collaborative practice agreement with Sharon Huffman MD.     A summary of these recommendations was sent via clinic portal.    Shannen Nguyen, Pharm.D.  Medication Therapy Management Pharmacist      Telemedicine Visit Details  The patient's medications can be safely assessed via a telemedicine encounter.  Type of service:  Telephone visit  Originating Location (pt. Location): Home    Distant Location (provider location):  On-site  Start Time: 4:00 PM  End Time: 4:09 PM     Medication Therapy Recommendations  Class 1 obesity without serious comorbidity with body mass index (BMI) of 30.0 to 30.9 in adult, unspecified obesity type   1 Rationale: Medication product not available - Adherence - Adherence   Recommendation: Change  Medication - Zepbound 2.5 MG/0.5ML Soaj   Status: Accepted per CPA   Identified Date: 4/21/2025 Completed Date: 4/21/2025         Moderate persistent asthma without complication   1 Current Medication: fluticasone-salmeterol (ADVAIR DISKUS) 250-50 MCG/DOSE diskus inhaler (Discontinued)   Current Medication Sig: Inhale 1 puff into the lungs 2 times daily   Rationale: Medication product not available - Adherence - Adherence   Recommendation: Change Medication - Symbicort 80-4.5 MCG/ACT Aero   Status: Accepted per CPA   Identified Date: 4/21/2025 Completed Date: 4/21/2025

## 2025-06-09 ENCOUNTER — LAB REQUISITION (OUTPATIENT)
Dept: LAB | Facility: CLINIC | Age: 40
End: 2025-06-09

## 2025-06-09 DIAGNOSIS — R63.4 ABNORMAL WEIGHT LOSS: ICD-10-CM

## 2025-06-09 PROCEDURE — 84443 ASSAY THYROID STIM HORMONE: CPT | Performed by: FAMILY MEDICINE

## 2025-06-10 ENCOUNTER — PATIENT OUTREACH (OUTPATIENT)
Dept: CARE COORDINATION | Facility: CLINIC | Age: 40
End: 2025-06-10
Payer: COMMERCIAL

## 2025-06-10 LAB — TSH SERPL DL<=0.005 MIU/L-ACNC: 1.22 UIU/ML (ref 0.3–4.2)

## 2025-07-19 ENCOUNTER — HEALTH MAINTENANCE LETTER (OUTPATIENT)
Age: 40
End: 2025-07-19